# Patient Record
Sex: MALE | Race: WHITE | NOT HISPANIC OR LATINO | URBAN - METROPOLITAN AREA
[De-identification: names, ages, dates, MRNs, and addresses within clinical notes are randomized per-mention and may not be internally consistent; named-entity substitution may affect disease eponyms.]

---

## 2022-08-01 ENCOUNTER — INPATIENT (INPATIENT)
Facility: HOSPITAL | Age: 62
LOS: 2 days | Discharge: HOME | End: 2022-08-04
Attending: STUDENT IN AN ORGANIZED HEALTH CARE EDUCATION/TRAINING PROGRAM | Admitting: STUDENT IN AN ORGANIZED HEALTH CARE EDUCATION/TRAINING PROGRAM

## 2022-08-01 VITALS
SYSTOLIC BLOOD PRESSURE: 137 MMHG | DIASTOLIC BLOOD PRESSURE: 820 MMHG | OXYGEN SATURATION: 95 % | HEART RATE: 70 BPM | RESPIRATION RATE: 16 BRPM | TEMPERATURE: 96 F

## 2022-08-01 LAB
ALBUMIN SERPL ELPH-MCNC: 3.7 G/DL — SIGNIFICANT CHANGE UP (ref 3.5–5.2)
ALP SERPL-CCNC: 67 U/L — SIGNIFICANT CHANGE UP (ref 30–115)
ALT FLD-CCNC: 16 U/L — SIGNIFICANT CHANGE UP (ref 0–41)
ANION GAP SERPL CALC-SCNC: 10 MMOL/L — SIGNIFICANT CHANGE UP (ref 7–14)
ANION GAP SERPL CALC-SCNC: 13 MMOL/L — SIGNIFICANT CHANGE UP (ref 7–14)
APAP SERPL-MCNC: <5 UG/ML — LOW (ref 10–30)
APPEARANCE UR: CLEAR — SIGNIFICANT CHANGE UP
AST SERPL-CCNC: 27 U/L — SIGNIFICANT CHANGE UP (ref 0–41)
BACTERIA # UR AUTO: ABNORMAL
BASE EXCESS BLDV CALC-SCNC: -2 MMOL/L — SIGNIFICANT CHANGE UP (ref -2–3)
BASOPHILS # BLD AUTO: 0.01 K/UL — SIGNIFICANT CHANGE UP (ref 0–0.2)
BASOPHILS NFR BLD AUTO: 0.1 % — SIGNIFICANT CHANGE UP (ref 0–1)
BILIRUB SERPL-MCNC: 1.1 MG/DL — SIGNIFICANT CHANGE UP (ref 0.2–1.2)
BILIRUB UR-MCNC: NEGATIVE — SIGNIFICANT CHANGE UP
BUN SERPL-MCNC: 11 MG/DL — SIGNIFICANT CHANGE UP (ref 10–20)
BUN SERPL-MCNC: 9 MG/DL — LOW (ref 10–20)
CA-I SERPL-SCNC: 1 MMOL/L — LOW (ref 1.15–1.33)
CALCIUM SERPL-MCNC: 7.3 MG/DL — LOW (ref 8.5–10.1)
CALCIUM SERPL-MCNC: 7.3 MG/DL — LOW (ref 8.5–10.1)
CHLORIDE SERPL-SCNC: 84 MMOL/L — LOW (ref 98–110)
CHLORIDE SERPL-SCNC: 87 MMOL/L — LOW (ref 98–110)
CHLORIDE UR-SCNC: 23 — SIGNIFICANT CHANGE UP
CO2 SERPL-SCNC: 18 MMOL/L — SIGNIFICANT CHANGE UP (ref 17–32)
CO2 SERPL-SCNC: 20 MMOL/L — SIGNIFICANT CHANGE UP (ref 17–32)
COLOR SPEC: SIGNIFICANT CHANGE UP
CREAT ?TM UR-MCNC: 18 MG/DL — SIGNIFICANT CHANGE UP
CREAT SERPL-MCNC: 0.6 MG/DL — LOW (ref 0.7–1.5)
CREAT SERPL-MCNC: 0.7 MG/DL — SIGNIFICANT CHANGE UP (ref 0.7–1.5)
DIFF PNL FLD: ABNORMAL
EGFR: 103 ML/MIN/1.73M2 — SIGNIFICANT CHANGE UP
EGFR: 108 ML/MIN/1.73M2 — SIGNIFICANT CHANGE UP
EOSINOPHIL # BLD AUTO: 0.01 K/UL — SIGNIFICANT CHANGE UP (ref 0–0.7)
EOSINOPHIL NFR BLD AUTO: 0.1 % — SIGNIFICANT CHANGE UP (ref 0–8)
EPI CELLS # UR: 22 /HPF — HIGH (ref 0–5)
ETHANOL SERPL-MCNC: <10 MG/DL — SIGNIFICANT CHANGE UP
GAS PNL BLDV: 119 MMOL/L — CRITICAL LOW (ref 136–145)
GAS PNL BLDV: SIGNIFICANT CHANGE UP
GLUCOSE SERPL-MCNC: 103 MG/DL — HIGH (ref 70–99)
GLUCOSE SERPL-MCNC: 93 MG/DL — SIGNIFICANT CHANGE UP (ref 70–99)
GLUCOSE UR QL: NEGATIVE — SIGNIFICANT CHANGE UP
HCO3 BLDV-SCNC: 22 MMOL/L — SIGNIFICANT CHANGE UP (ref 22–29)
HCT VFR BLD CALC: 34 % — LOW (ref 42–52)
HCT VFR BLDA CALC: 40 % — SIGNIFICANT CHANGE UP (ref 39–51)
HGB BLD CALC-MCNC: 13.4 G/DL — SIGNIFICANT CHANGE UP (ref 12.6–17.4)
HGB BLD-MCNC: 12.9 G/DL — LOW (ref 14–18)
HYALINE CASTS # UR AUTO: 4 /LPF — SIGNIFICANT CHANGE UP (ref 0–7)
IMM GRANULOCYTES NFR BLD AUTO: 0.7 % — HIGH (ref 0.1–0.3)
KETONES UR-MCNC: ABNORMAL
LACTATE BLDV-MCNC: 0.8 MMOL/L — SIGNIFICANT CHANGE UP (ref 0.5–2)
LEUKOCYTE ESTERASE UR-ACNC: ABNORMAL
LYMPHOCYTES # BLD AUTO: 0.88 K/UL — LOW (ref 1.2–3.4)
LYMPHOCYTES # BLD AUTO: 8.6 % — LOW (ref 20.5–51.1)
MCHC RBC-ENTMCNC: 32 PG — HIGH (ref 27–31)
MCHC RBC-ENTMCNC: 37.9 G/DL — HIGH (ref 32–37)
MCV RBC AUTO: 84.4 FL — SIGNIFICANT CHANGE UP (ref 80–94)
MONOCYTES # BLD AUTO: 0.6 K/UL — SIGNIFICANT CHANGE UP (ref 0.1–0.6)
MONOCYTES NFR BLD AUTO: 5.9 % — SIGNIFICANT CHANGE UP (ref 1.7–9.3)
NEUTROPHILS # BLD AUTO: 8.62 K/UL — HIGH (ref 1.4–6.5)
NEUTROPHILS NFR BLD AUTO: 84.6 % — HIGH (ref 42.2–75.2)
NITRITE UR-MCNC: POSITIVE
NRBC # BLD: 0 /100 WBCS — SIGNIFICANT CHANGE UP (ref 0–0)
OSMOLALITY SERPL: 257 MOS/KG — LOW (ref 280–301)
OSMOLALITY UR: 177 MOS/KG — SIGNIFICANT CHANGE UP (ref 50–1200)
PCO2 BLDV: 36 MMHG — LOW (ref 42–55)
PH BLDV: 7.4 — SIGNIFICANT CHANGE UP (ref 7.32–7.43)
PH UR: 6.5 — SIGNIFICANT CHANGE UP (ref 5–8)
PLATELET # BLD AUTO: 107 K/UL — LOW (ref 130–400)
PO2 BLDV: 56 MMHG — SIGNIFICANT CHANGE UP
POTASSIUM BLDV-SCNC: 3.9 MMOL/L — SIGNIFICANT CHANGE UP (ref 3.5–5.1)
POTASSIUM SERPL-MCNC: 3.8 MMOL/L — SIGNIFICANT CHANGE UP (ref 3.5–5)
POTASSIUM SERPL-MCNC: 4.4 MMOL/L — SIGNIFICANT CHANGE UP (ref 3.5–5)
POTASSIUM SERPL-SCNC: 3.8 MMOL/L — SIGNIFICANT CHANGE UP (ref 3.5–5)
POTASSIUM SERPL-SCNC: 4.4 MMOL/L — SIGNIFICANT CHANGE UP (ref 3.5–5)
POTASSIUM UR-SCNC: 17 MMOL/L — SIGNIFICANT CHANGE UP
PROT SERPL-MCNC: 5.8 G/DL — LOW (ref 6–8)
PROT UR-MCNC: SIGNIFICANT CHANGE UP
RBC # BLD: 4.03 M/UL — LOW (ref 4.7–6.1)
RBC # FLD: 11.8 % — SIGNIFICANT CHANGE UP (ref 11.5–14.5)
RBC CASTS # UR COMP ASSIST: 8 /HPF — HIGH (ref 0–4)
SALICYLATES SERPL-MCNC: <0.3 MG/DL — LOW (ref 4–30)
SAO2 % BLDV: 84.5 % — SIGNIFICANT CHANGE UP
SARS-COV-2 RNA SPEC QL NAA+PROBE: DETECTED
SODIUM SERPL-SCNC: 115 MMOL/L — CRITICAL LOW (ref 135–146)
SODIUM SERPL-SCNC: 117 MMOL/L — CRITICAL LOW (ref 135–146)
SODIUM UR-SCNC: <20 MMOL/L — SIGNIFICANT CHANGE UP
SP GR SPEC: 1.02 — SIGNIFICANT CHANGE UP (ref 1.01–1.03)
TROPONIN T SERPL-MCNC: <0.01 NG/ML — SIGNIFICANT CHANGE UP
UROBILINOGEN FLD QL: SIGNIFICANT CHANGE UP
WBC # BLD: 10.19 K/UL — SIGNIFICANT CHANGE UP (ref 4.8–10.8)
WBC # FLD AUTO: 10.19 K/UL — SIGNIFICANT CHANGE UP (ref 4.8–10.8)
WBC UR QL: 7 /HPF — HIGH (ref 0–5)

## 2022-08-01 PROCEDURE — 93010 ELECTROCARDIOGRAM REPORT: CPT

## 2022-08-01 PROCEDURE — 99291 CRITICAL CARE FIRST HOUR: CPT

## 2022-08-01 PROCEDURE — 70498 CT ANGIOGRAPHY NECK: CPT | Mod: 26,MA

## 2022-08-01 PROCEDURE — 70496 CT ANGIOGRAPHY HEAD: CPT | Mod: 26,MA

## 2022-08-01 PROCEDURE — 0042T: CPT | Mod: MA

## 2022-08-01 PROCEDURE — 71045 X-RAY EXAM CHEST 1 VIEW: CPT | Mod: 26

## 2022-08-01 RX ORDER — ONDANSETRON 8 MG/1
4 TABLET, FILM COATED ORAL ONCE
Refills: 0 | Status: COMPLETED | OUTPATIENT
Start: 2022-08-01 | End: 2022-08-01

## 2022-08-01 RX ORDER — SODIUM CHLORIDE 9 MG/ML
1000 INJECTION INTRAMUSCULAR; INTRAVENOUS; SUBCUTANEOUS ONCE
Refills: 0 | Status: COMPLETED | OUTPATIENT
Start: 2022-08-01 | End: 2022-08-01

## 2022-08-01 RX ORDER — CEFTRIAXONE 500 MG/1
1000 INJECTION, POWDER, FOR SOLUTION INTRAMUSCULAR; INTRAVENOUS ONCE
Refills: 0 | Status: COMPLETED | OUTPATIENT
Start: 2022-08-01 | End: 2022-08-01

## 2022-08-01 RX ADMIN — SODIUM CHLORIDE 1000 MILLILITER(S): 9 INJECTION INTRAMUSCULAR; INTRAVENOUS; SUBCUTANEOUS at 21:06

## 2022-08-01 RX ADMIN — ONDANSETRON 4 MILLIGRAM(S): 8 TABLET, FILM COATED ORAL at 16:37

## 2022-08-01 RX ADMIN — CEFTRIAXONE 100 MILLIGRAM(S): 500 INJECTION, POWDER, FOR SOLUTION INTRAMUSCULAR; INTRAVENOUS at 22:00

## 2022-08-01 RX ADMIN — SODIUM CHLORIDE 1000 MILLILITER(S): 9 INJECTION INTRAMUSCULAR; INTRAVENOUS; SUBCUTANEOUS at 17:17

## 2022-08-01 NOTE — CONSULT NOTE ADULT - ASSESSMENT
Impression:  62 y.o. male, PMH of psychosis, GERD, HLD, BIBA for evaluation. Patient was last seen normal yesterday. Today, at 3pm staff of [facility] went to check on a patient and found him vomiting, minimally responsive. On arrival, he was awake, does not respond to questions, right pupil 4mm and left 2,mmpupils > right. Code stroke called on arrival. Not a candidate for IV thrombolytics due to being out of the window, not a candidate for IA intervention due to no LVO on CTA.     Suggestion:  Routine EEG  Seizure precauitons  Keep magnesium >2  Medical workup for encephalopathy.  CK, liver enzymes   If no cause found for AMS and rigidity, suggest toxicology consult.   MRI brain without tavon

## 2022-08-01 NOTE — STROKE CODE NOTE - IV ALTEPLASE EXCLUSION ABS OTHER
last known the previous day, not a candidate for IV thrombolytics. Patient without LVO on CTA not a candidate for IA intervention.

## 2022-08-01 NOTE — ED PROVIDER NOTE - CLINICAL SUMMARY MEDICAL DECISION MAKING FREE TEXT BOX
Pt with AMS and hyponatremia. Mental status improving while in the ED. Pt hydrated. Will admit to ICU for further care.

## 2022-08-01 NOTE — ED ADULT NURSE NOTE - OBJECTIVE STATEMENT
brought in from [facility] for AMS . Around 3pm ,patient was found minimally responsive with nausea and vomiting .Patient is alert not able to answer anything

## 2022-08-01 NOTE — CONSULT NOTE ADULT - SUBJECTIVE AND OBJECTIVE BOX
Neurology Consult    Patient is a 62y old  Male who presents with a chief complaint of altered mental status    HPI:  62 y.o. male, PMH of psychosis, GERD, HLD, BIBA for evaluation. Patient was last seen normal yesterday. Today, at 3pm staff of [facility] went to check on a patient and found him vomiting, minimally responsive. On arrival, he was awake, does not respond to questions, right pupil 4mm and left 2,mmpupils > right. Code stroke called on arrival.    PAST MEDICAL & SURGICAL HISTORY:      FAMILY HISTORY:      Social History: (-) x 3    Allergies    No Known Allergies    Intolerances        MEDICATIONS  (STANDING):  ondansetron Injectable 4 milliGRAM(s) IV Push once    MEDICATIONS  (PRN):    Vital Signs Last 24 Hrs  T(C): 35.6 (01 Aug 2022 16:28), Max: 35.6 (01 Aug 2022 16:28)  T(F): 96 (01 Aug 2022 16:28), Max: 96 (01 Aug 2022 16:28)  HR: 70 (01 Aug 2022 16:28) (70 - 70)  BP: 137/820 (01 Aug 2022 16:28) (137/820 - 137/820)  BP(mean): --  RR: 16 (01 Aug 2022 16:28) (16 - 16)  SpO2: 95% (01 Aug 2022 16:28) (95% - 95%)    Parameters below as of 01 Aug 2022 16:28  Patient On (Oxygen Delivery Method): room air        Examination:  Cognitive/Language: awake, not following commands    Eyes: reacts in both eyes to visual threat  right pupil is 4mm and non reactive, left pupil is 2mm and sluggish .  Face:no facial asymmetry  Motor examination: Bilateral UE flexed with increased tone. Bilateral LE can be flexed passively but tone is mildly increased. Mild tremor to bilateral UE LE  Formal Muscle Strength Testing: Moves all extremities symmetrically and spontaneously   Sensory examination:   Withdraws to peripheral noxious stimuli in all extremities.  Cerebellum:   Limited by mental status Gait deferred  Labs:                     Neuroimaging:  NCHCT:   < from: CT Angio Neck Stroke Protocol w/ IV Cont (08.01.22 @ 17:01) >  IMPRESSION:    1.  Motion limited study. No evidence of major vascular stenosis or   occlusion. Normal perfusion images.    2.  Retained fluid within a distended lower esophagus.    --- End of Report ---    < end of copied text >  < from: CT Brain Stroke Protocol (08.01.22 @ 16:44) >  IMPRESSION:    No evidence of acute intracranial hemorrhage or large territory infarct.      < end of copied text >    08-01-22 @ 17:56

## 2022-08-01 NOTE — CONSULT NOTE ADULT - NS ATTEND AMEND GEN_ALL_CORE FT
Patient seen and examined and agree with above except as noted.  Patients history, notes, labs, imaging, vitals and meds reviewed personally.  Patient presented with asymmetric pupils and poor responsiveness found to be severely hyponatremic     Plan as above (REEG; correct metabolic derangements)

## 2022-08-01 NOTE — ED ADULT TRIAGE NOTE - CHIEF COMPLAINT QUOTE
Pt BIBA from assisted living with altered mental status, right pupil dilation, vomiting and left arm contracted. Pt last seen well yesterday at unknown time. Code stroke activated. Pt + covid 7/26

## 2022-08-01 NOTE — ED PROVIDER NOTE - OBJECTIVE STATEMENT
63 yo male w/ PMH of psychosis, GERD, HLD presents from [facility] for AMS. Last time seen normal was yesterday, unknown exact time.  Today at 3pm, staff went to check up on him, found him unresponsive.  Unable to provide further HPI due to AMS.

## 2022-08-01 NOTE — ED PROVIDER NOTE - PHYSICAL EXAMINATION
GENERAL: Vomitus noted on pt, minimally responsive to painful stimuli only.   SKIN: warm, dry  HEAD: Normocephalic; atraumatic.  EYES: R pupil 4mm and nonreactive, L pupil 2mm  CARD: S1, S2 normal; no murmurs, gallops, or rubs. Regular rate and rhythm.   RESP: LCTAB; No wheezes, rales, rhonchi, or stridor.  ABD: soft, nontender, and nondistended  NEURO: Withdrawing to painful stimuli only, left arm contracted

## 2022-08-01 NOTE — ED PROVIDER NOTE - ATTENDING CONTRIBUTION TO CARE
62 y.o. male, PMH of psychosis, GERD, HLD, BIBA for evaluation. Pt was last seen normal yesterday. Today, at 3pm staff of [facility] went to check on a pt and found him vomiting, minimally responsive. On arrival, pt is awake, does not respond to questions, head NC/AT, no droop noted, left pupils > right, lungs CTA B/L, CV S1S2 regular, abdomen soft/NT/ND/(+)BS, ext (-) edema, moving all extremities in response to pain, does not follow command. Code stroke called on arrival. Will do labs, CT, XR, UA and reevaluate. Pt was COVID (+) on 7/26/22.

## 2022-08-02 LAB
ALBUMIN SERPL ELPH-MCNC: 3.6 G/DL — SIGNIFICANT CHANGE UP (ref 3.5–5.2)
ALBUMIN SERPL ELPH-MCNC: 3.7 G/DL — SIGNIFICANT CHANGE UP (ref 3.5–5.2)
ALBUMIN SERPL ELPH-MCNC: 4.2 G/DL — SIGNIFICANT CHANGE UP (ref 3.5–5.2)
ALP SERPL-CCNC: 68 U/L — SIGNIFICANT CHANGE UP (ref 30–115)
ALP SERPL-CCNC: 70 U/L — SIGNIFICANT CHANGE UP (ref 30–115)
ALP SERPL-CCNC: 80 U/L — SIGNIFICANT CHANGE UP (ref 30–115)
ALT FLD-CCNC: 18 U/L — SIGNIFICANT CHANGE UP (ref 0–41)
ALT FLD-CCNC: 20 U/L — SIGNIFICANT CHANGE UP (ref 0–41)
ALT FLD-CCNC: 20 U/L — SIGNIFICANT CHANGE UP (ref 0–41)
ANION GAP SERPL CALC-SCNC: 10 MMOL/L — SIGNIFICANT CHANGE UP (ref 7–14)
ANION GAP SERPL CALC-SCNC: 13 MMOL/L — SIGNIFICANT CHANGE UP (ref 7–14)
ANION GAP SERPL CALC-SCNC: 9 MMOL/L — SIGNIFICANT CHANGE UP (ref 7–14)
AST SERPL-CCNC: 26 U/L — SIGNIFICANT CHANGE UP (ref 0–41)
AST SERPL-CCNC: 27 U/L — SIGNIFICANT CHANGE UP (ref 0–41)
AST SERPL-CCNC: 32 U/L — SIGNIFICANT CHANGE UP (ref 0–41)
BASOPHILS # BLD AUTO: 0.01 K/UL — SIGNIFICANT CHANGE UP (ref 0–0.2)
BASOPHILS NFR BLD AUTO: 0.2 % — SIGNIFICANT CHANGE UP (ref 0–1)
BILIRUB SERPL-MCNC: 0.3 MG/DL — SIGNIFICANT CHANGE UP (ref 0.2–1.2)
BILIRUB SERPL-MCNC: 0.4 MG/DL — SIGNIFICANT CHANGE UP (ref 0.2–1.2)
BILIRUB SERPL-MCNC: 0.5 MG/DL — SIGNIFICANT CHANGE UP (ref 0.2–1.2)
BUN SERPL-MCNC: 8 MG/DL — LOW (ref 10–20)
BUN SERPL-MCNC: 9 MG/DL — LOW (ref 10–20)
BUN SERPL-MCNC: 9 MG/DL — LOW (ref 10–20)
CALCIUM SERPL-MCNC: 7.9 MG/DL — LOW (ref 8.5–10.1)
CALCIUM SERPL-MCNC: 8.3 MG/DL — LOW (ref 8.5–10.1)
CALCIUM SERPL-MCNC: 9 MG/DL — SIGNIFICANT CHANGE UP (ref 8.5–10.1)
CHLORIDE SERPL-SCNC: 100 MMOL/L — SIGNIFICANT CHANGE UP (ref 98–110)
CHLORIDE SERPL-SCNC: 100 MMOL/L — SIGNIFICANT CHANGE UP (ref 98–110)
CHLORIDE SERPL-SCNC: 99 MMOL/L — SIGNIFICANT CHANGE UP (ref 98–110)
CK SERPL-CCNC: 578 U/L — HIGH (ref 0–225)
CO2 SERPL-SCNC: 20 MMOL/L — SIGNIFICANT CHANGE UP (ref 17–32)
CO2 SERPL-SCNC: 23 MMOL/L — SIGNIFICANT CHANGE UP (ref 17–32)
CO2 SERPL-SCNC: 27 MMOL/L — SIGNIFICANT CHANGE UP (ref 17–32)
CREAT SERPL-MCNC: 0.7 MG/DL — SIGNIFICANT CHANGE UP (ref 0.7–1.5)
CREAT SERPL-MCNC: 0.7 MG/DL — SIGNIFICANT CHANGE UP (ref 0.7–1.5)
CREAT SERPL-MCNC: 0.9 MG/DL — SIGNIFICANT CHANGE UP (ref 0.7–1.5)
CRP SERPL-MCNC: 3 MG/L — SIGNIFICANT CHANGE UP
D DIMER BLD IA.RAPID-MCNC: <150 NG/ML DDU — SIGNIFICANT CHANGE UP (ref 0–230)
D DIMER BLD IA.RAPID-MCNC: <150 NG/ML DDU — SIGNIFICANT CHANGE UP (ref 0–230)
EGFR: 103 ML/MIN/1.73M2 — SIGNIFICANT CHANGE UP
EGFR: 103 ML/MIN/1.73M2 — SIGNIFICANT CHANGE UP
EGFR: 95 ML/MIN/1.73M2 — SIGNIFICANT CHANGE UP
EOSINOPHIL # BLD AUTO: 0 K/UL — SIGNIFICANT CHANGE UP (ref 0–0.7)
EOSINOPHIL NFR BLD AUTO: 0 % — SIGNIFICANT CHANGE UP (ref 0–8)
GLUCOSE BLDC GLUCOMTR-MCNC: 102 MG/DL — HIGH (ref 70–99)
GLUCOSE BLDC GLUCOMTR-MCNC: 374 MG/DL — HIGH (ref 70–99)
GLUCOSE BLDC GLUCOMTR-MCNC: 79 MG/DL — SIGNIFICANT CHANGE UP (ref 70–99)
GLUCOSE SERPL-MCNC: 106 MG/DL — HIGH (ref 70–99)
GLUCOSE SERPL-MCNC: 128 MG/DL — HIGH (ref 70–99)
GLUCOSE SERPL-MCNC: 77 MG/DL — SIGNIFICANT CHANGE UP (ref 70–99)
HCT VFR BLD CALC: 41.9 % — LOW (ref 42–52)
HCV AB S/CO SERPL IA: 0.03 COI — SIGNIFICANT CHANGE UP
HCV AB SERPL-IMP: SIGNIFICANT CHANGE UP
HGB BLD-MCNC: 14.9 G/DL — SIGNIFICANT CHANGE UP (ref 14–18)
IMM GRANULOCYTES NFR BLD AUTO: 0.5 % — HIGH (ref 0.1–0.3)
LDH SERPL L TO P-CCNC: 216 U/L — SIGNIFICANT CHANGE UP (ref 50–242)
LYMPHOCYTES # BLD AUTO: 1.15 K/UL — LOW (ref 1.2–3.4)
LYMPHOCYTES # BLD AUTO: 19.2 % — LOW (ref 20.5–51.1)
MAGNESIUM SERPL-MCNC: 2 MG/DL — SIGNIFICANT CHANGE UP (ref 1.8–2.4)
MCHC RBC-ENTMCNC: 30.7 PG — SIGNIFICANT CHANGE UP (ref 27–31)
MCHC RBC-ENTMCNC: 35.6 G/DL — SIGNIFICANT CHANGE UP (ref 32–37)
MCV RBC AUTO: 86.4 FL — SIGNIFICANT CHANGE UP (ref 80–94)
MONOCYTES # BLD AUTO: 0.8 K/UL — HIGH (ref 0.1–0.6)
MONOCYTES NFR BLD AUTO: 13.4 % — HIGH (ref 1.7–9.3)
NEUTROPHILS # BLD AUTO: 3.99 K/UL — SIGNIFICANT CHANGE UP (ref 1.4–6.5)
NEUTROPHILS NFR BLD AUTO: 66.7 % — SIGNIFICANT CHANGE UP (ref 42.2–75.2)
NRBC # BLD: 0 /100 WBCS — SIGNIFICANT CHANGE UP (ref 0–0)
PLATELET # BLD AUTO: 172 K/UL — SIGNIFICANT CHANGE UP (ref 130–400)
POTASSIUM SERPL-MCNC: 4.4 MMOL/L — SIGNIFICANT CHANGE UP (ref 3.5–5)
POTASSIUM SERPL-MCNC: 4.9 MMOL/L — SIGNIFICANT CHANGE UP (ref 3.5–5)
POTASSIUM SERPL-MCNC: 5.1 MMOL/L — HIGH (ref 3.5–5)
POTASSIUM SERPL-SCNC: 4.4 MMOL/L — SIGNIFICANT CHANGE UP (ref 3.5–5)
POTASSIUM SERPL-SCNC: 4.9 MMOL/L — SIGNIFICANT CHANGE UP (ref 3.5–5)
POTASSIUM SERPL-SCNC: 5.1 MMOL/L — HIGH (ref 3.5–5)
PROT SERPL-MCNC: 5.7 G/DL — LOW (ref 6–8)
PROT SERPL-MCNC: 5.9 G/DL — LOW (ref 6–8)
PROT SERPL-MCNC: 6.4 G/DL — SIGNIFICANT CHANGE UP (ref 6–8)
RBC # BLD: 4.85 M/UL — SIGNIFICANT CHANGE UP (ref 4.7–6.1)
RBC # FLD: 12.1 % — SIGNIFICANT CHANGE UP (ref 11.5–14.5)
SODIUM SERPL-SCNC: 131 MMOL/L — LOW (ref 135–146)
SODIUM SERPL-SCNC: 133 MMOL/L — LOW (ref 135–146)
SODIUM SERPL-SCNC: 137 MMOL/L — SIGNIFICANT CHANGE UP (ref 135–146)
WBC # BLD: 5.98 K/UL — SIGNIFICANT CHANGE UP (ref 4.8–10.8)
WBC # FLD AUTO: 5.98 K/UL — SIGNIFICANT CHANGE UP (ref 4.8–10.8)

## 2022-08-02 PROCEDURE — 99222 1ST HOSP IP/OBS MODERATE 55: CPT

## 2022-08-02 PROCEDURE — 95816 EEG AWAKE AND DROWSY: CPT | Mod: 26

## 2022-08-02 RX ORDER — NICOTINE POLACRILEX 2 MG
1 GUM BUCCAL DAILY
Refills: 0 | Status: DISCONTINUED | OUTPATIENT
Start: 2022-08-02 | End: 2022-08-04

## 2022-08-02 RX ORDER — CLOZAPINE 150 MG/1
400 TABLET, ORALLY DISINTEGRATING ORAL AT BEDTIME
Refills: 0 | Status: DISCONTINUED | OUTPATIENT
Start: 2022-08-02 | End: 2022-08-03

## 2022-08-02 RX ORDER — PANTOPRAZOLE SODIUM 20 MG/1
40 TABLET, DELAYED RELEASE ORAL
Refills: 0 | Status: DISCONTINUED | OUTPATIENT
Start: 2022-08-02 | End: 2022-08-04

## 2022-08-02 RX ORDER — SENNA PLUS 8.6 MG/1
2 TABLET ORAL AT BEDTIME
Refills: 0 | Status: DISCONTINUED | OUTPATIENT
Start: 2022-08-02 | End: 2022-08-04

## 2022-08-02 RX ORDER — ENOXAPARIN SODIUM 100 MG/ML
40 INJECTION SUBCUTANEOUS EVERY 24 HOURS
Refills: 0 | Status: DISCONTINUED | OUTPATIENT
Start: 2022-08-02 | End: 2022-08-04

## 2022-08-02 RX ORDER — SODIUM CHLORIDE 9 MG/ML
500 INJECTION INTRAMUSCULAR; INTRAVENOUS; SUBCUTANEOUS ONCE
Refills: 0 | Status: COMPLETED | OUTPATIENT
Start: 2022-08-02 | End: 2022-08-02

## 2022-08-02 RX ORDER — DESMOPRESSIN ACETATE 0.1 MG/1
0.1 TABLET ORAL ONCE
Refills: 0 | Status: DISCONTINUED | OUTPATIENT
Start: 2022-08-02 | End: 2022-08-02

## 2022-08-02 RX ORDER — POLYETHYLENE GLYCOL 3350 17 G/17G
17 POWDER, FOR SOLUTION ORAL
Refills: 0 | Status: DISCONTINUED | OUTPATIENT
Start: 2022-08-02 | End: 2022-08-04

## 2022-08-02 RX ORDER — CEFTRIAXONE 500 MG/1
1000 INJECTION, POWDER, FOR SOLUTION INTRAMUSCULAR; INTRAVENOUS EVERY 24 HOURS
Refills: 0 | Status: COMPLETED | OUTPATIENT
Start: 2022-08-02 | End: 2022-08-04

## 2022-08-02 RX ORDER — SODIUM CHLORIDE 9 MG/ML
1000 INJECTION, SOLUTION INTRAVENOUS
Refills: 0 | Status: DISCONTINUED | OUTPATIENT
Start: 2022-08-02 | End: 2022-08-02

## 2022-08-02 RX ORDER — SODIUM CHLORIDE 9 MG/ML
1000 INJECTION, SOLUTION INTRAVENOUS
Refills: 0 | Status: DISCONTINUED | OUTPATIENT
Start: 2022-08-02 | End: 2022-08-03

## 2022-08-02 RX ADMIN — ENOXAPARIN SODIUM 40 MILLIGRAM(S): 100 INJECTION SUBCUTANEOUS at 08:12

## 2022-08-02 RX ADMIN — PANTOPRAZOLE SODIUM 40 MILLIGRAM(S): 20 TABLET, DELAYED RELEASE ORAL at 08:11

## 2022-08-02 RX ADMIN — CEFTRIAXONE 100 MILLIGRAM(S): 500 INJECTION, POWDER, FOR SOLUTION INTRAMUSCULAR; INTRAVENOUS at 06:01

## 2022-08-02 RX ADMIN — SODIUM CHLORIDE 1000 MILLILITER(S): 9 INJECTION INTRAMUSCULAR; INTRAVENOUS; SUBCUTANEOUS at 08:12

## 2022-08-02 RX ADMIN — SODIUM CHLORIDE 100 MILLILITER(S): 9 INJECTION, SOLUTION INTRAVENOUS at 11:36

## 2022-08-02 NOTE — H&P ADULT - NSHPLABSRESULTS_GEN_ALL_CORE
LABS:  cret                        12.9   10.19 )-----------( 107      ( 01 Aug 2022 18:07 )             34.0     08-01    117<LL>  |  87<L>  |  9<L>  ----------------------------<  93  3.8   |  20  |  0.6<L>    Ca    7.3<L>      01 Aug 2022 21:10    TPro  5.8<L>  /  Alb  3.7  /  TBili  1.1  /  DBili  x   /  AST  27  /  ALT  16  /  AlkPhos  67  08-01      troponin: <0.01    < from: CT Brain Stroke Protocol (08.01.22 @ 16:44) >    IMPRESSION:    No evidence of acute intracranial hemorrhage or large territory infarct.    < end of copied text >    < from: CT Brain Perfusion Maps Stroke (08.01.22 @ 16:57) >    IMPRESSION:    1.  Motion limited study. No evidence of major vascular stenosis or   occlusion. Normal perfusion images.    < end of copied text >    < from: CT Angio Neck Stroke Protocol w/ IV Cont (08.01.22 @ 17:01) >    IMPRESSION:    1.  Motion limited study. No evidence of major vascular stenosis or   occlusion. Normal perfusion images.    2.  Retained fluid within a distended lower esophagus.    < end of copied text >    < from: Xray Chest 1 View AP/PA (08.01.22 @ 18:51) >    Impression:    No radiographic evidence of acute cardiopulmonary disease.    < end of copied text >

## 2022-08-02 NOTE — H&P ADULT - NSICDXPASTMEDICALHX_GEN_ALL_CORE_FT
PAST MEDICAL HISTORY:  GERD (gastroesophageal reflux disease)     Hyperlipidemia     Schizophrenia

## 2022-08-02 NOTE — ED ADULT NURSE REASSESSMENT NOTE - NS ED NURSE REASSESS COMMENT FT1
Health care proxy called unit and wanted to confirm his medications, clozapine 100mg at night 4 tabs total of 400 mg was not ordered. MD Hdz [id number] contacted and made aware due to patient becoming uncooperative he states "were postioning him". Health care proxy called unit and wanted to confirm his medications, clozapine 100mg at night 4 tabs total of 400 mg was not ordered. MD Hdz [id number] contacted and made aware due to patient becoming uncooperative he states "were poisoning him".

## 2022-08-02 NOTE — SWALLOW BEDSIDE ASSESSMENT ADULT - SLP PERTINENT HISTORY OF CURRENT PROBLEM
62 y.o M adm from [facility] for AMS , dx'd w/ severe hyponatremia w/ encephalopathy . +COVID. + UA< CTH negative for acute changes. Pmhx: psychosis, GERD, HLD<

## 2022-08-02 NOTE — CONSULT NOTE ADULT - ASSESSMENT
61 yo male w/ PMH of psychosis, GERD, HLD presents from [facility] for AMS. Last time seen normal was yesterday. Stroke work up negative, patient found to be in hyponatremia with encephalopathy. he received 2.5 L of NS with overcorrection of his Na levels. Nephro consult    Assessment and plan  Hyponatremia overcorrected/ Schizophernia  Hyponatremia initially secondary to low solute intake, low urine osm with low urine Na  s/p 2.5 L of NS with overcorrection 115-> 137 in 12 hours  patient started on D5W infusion at 100 cc/hr  c/w d5w, target na today around 125  repeat lytes q 8 hours  will follow

## 2022-08-02 NOTE — CONSULT NOTE ADULT - ASSESSMENT
IMPRESSION:    Severe hyponatremia   COVID 19 infection   HO schizophrenia      PLAN:    CNS:  No depressants.  Continue home psych medications     HEENT: Oral care    PULMONARY:  HOB @ 45 degrees.  Aspiration precautions.  Monitor PUlse Ox.      CARDIOVASCULAR:  NS 50 cc per hour.  Avoid over correction.      GI: GI prophylaxis.  Feeding.  Bowel regimen.  Free H2O restriction     RENAL:  Follow up lytes in 4 hours.  Correct as needed.  Water restriction.  Monitor Lytes.  Avoid over correction     INFECTIOUS DISEASE: Follow up cultures.  Procal.  ID Eval.  Hold ABX for now     HEMATOLOGICAL:  DVT prophylaxis.  Dimer     ENDOCRINE:  Follow up FS.      MUSCULOSKELETAL:  OOB to chair with fall precautions.      SDU For now

## 2022-08-02 NOTE — CONSULT NOTE ADULT - SUBJECTIVE AND OBJECTIVE BOX
Patient is a 62y old  Male who presents with a chief complaint of hyponatremia (02 Aug 2022 01:48)      HPI:   63 yo male w/ PMH of psychosis, GERD, HLD presents from [facility] for AMS. Last time seen normal was yesterday. At the time of change in mental status, staff noticed that he was unresponsive. At the ED, patient VS were stable, slightly afebrile (35.6C), 95% on RA.  BG was 120.  Stroke code was called on arrival. He was found to be awake, not responding to questions, Right pupil 4mm, left pupil 2mm. CT head showed no acute intracranial pathology, CTA shows no LVO. Labs were remarkable for Hb (12.9), and Sodium (115) + chloride (88) + low osmolality (257). Urine osmolality normal (117) and Na Urine < 20. UA (+) for LE, nitrite, and bacteria. COVID-19 (+). Alcohol, salicylate, and acetaminophen were (-). Patient was given 1x 1G IV ceftriaxone, and NS 2L bolus. Sodium subsequently improved to 117 in 3 hours. On exam when talking with patient, he does not remember why he was brought to the hospital. He has no symptomatic complaints. Denies headaches, dizziness, confusion, chest pain, shortness of breath, abdominal pain, n/v, dysuria.     Patient is being admitted to ICU for severe hyponatremia with encephalopathy.     Update: patient takes clozapine and thiothiexene. Patient does not know dose, no pharmacy listed on record or physical chart. Please confirm dose tomorrow before restarting.  (02 Aug 2022 01:48)      PAST MEDICAL & SURGICAL HISTORY:  Schizophrenia      Hyperlipidemia      GERD (gastroesophageal reflux disease)          SOCIAL HX:   Smoking   Positive                       ETOH                            Other    FAMILY HISTORY:  :  No known cardiovacular family hisotry     Review Of Systems:     All ROS are negative except per HPI       Allergies    No Known Allergies    Intolerances          PHYSICAL EXAM    ICU Vital Signs Last 24 Hrs  T(C): 36.7 (02 Aug 2022 07:12), Max: 37.1 (01 Aug 2022 19:54)  T(F): 98.1 (02 Aug 2022 07:12), Max: 98.8 (01 Aug 2022 19:54)  HR: 75 (02 Aug 2022 07:12) (70 - 79)  BP: 100/68 (02 Aug 2022 07:12) (100/68 - 140/68)  BP(mean): --  ABP: --  ABP(mean): --  RR: 18 (02 Aug 2022 07:12) (16 - 18)  SpO2: 99% (02 Aug 2022 07:12) (95% - 99%)    O2 Parameters below as of 01 Aug 2022 21:29  Patient On (Oxygen Delivery Method): room air            CONSTITUTIONAL:  In NAD    ENT:   Airway patent,   Mouth with normal mucosa.       CARDIAC:   Normal rate,   Regular rhythm.        RESPIRATORY:   No wheezing  Bilateral BS   Not tachypneic,  No use of accessory muscles    GASTROINTESTINAL:  Abdomen soft,   Non-tender,   No guarding,   + BS      NEUROLOGICAL:   Alert   Follows simple commands   No motor deficits.    SKIN:   Skin normal color for race,   No evidence of rash.                22 @ 07:01  -  22 @ 07:00  --------------------------------------------------------  IN:    IV PiggyBack: 50 mL    Sodium Chloride 0.9% Bolus: 2000 mL  Total IN: 2050 mL    OUT:    Voided (mL): 4000 mL  Total OUT: 4000 mL    Total NET: -1950 mL          LABS:                          12.9   10.19 )-----------( 107      ( 01 Aug 2022 18:07 )             34.0                                               08    117<LL>  |  87<L>  |  9<L>  ----------------------------<  93  3.8   |  20  |  0.6<L>    Ca    7.3<L>      01 Aug 2022 21:10    TPro  5.8<L>  /  Alb  3.7  /  TBili  1.1  /  DBili  x   /  AST  27  /  ALT  16  /  AlkPhos  67  08                                             Urinalysis Basic - ( 01 Aug 2022 19:40 )    Color: Light Yellow / Appearance: Clear / S.017 / pH: x  Gluc: x / Ketone: Small  / Bili: Negative / Urobili: <2 mg/dL   Blood: x / Protein: Trace / Nitrite: Positive   Leuk Esterase: Moderate / RBC: 8 /HPF / WBC 7 /HPF   Sq Epi: x / Non Sq Epi: 22 /HPF / Bacteria: Many        CARDIAC MARKERS ( 01 Aug 2022 18:07 )  x     / <0.01 ng/mL / x     / x     / x                                                LIVER FUNCTIONS - ( 01 Aug 2022 18:07 )  Alb: 3.7 g/dL / Pro: 5.8 g/dL / ALK PHOS: 67 U/L / ALT: 16 U/L / AST: 27 U/L / GGT: x                                                                                                                                       X-Rays reviewed                                                                                     ECHO        MEDICATIONS  (STANDING):  cefTRIAXone   IVPB 1000 milliGRAM(s) IV Intermittent every 24 hours  enoxaparin Injectable 40 milliGRAM(s) SubCutaneous every 24 hours  nicotine -   7 mG/24Hr(s) Patch 1 Patch Transdermal daily  pantoprazole    Tablet 40 milliGRAM(s) Oral before breakfast    MEDICATIONS  (PRN):

## 2022-08-02 NOTE — H&P ADULT - NSHPPHYSICALEXAM_GEN_ALL_CORE
PHYSICAL EXAM:  GENERAL: NAD, lying in bed comfortably  HEAD:  Atraumatic, Normocephalic  EYES: EOMI, pupils Right 4mm > Left 2mm, equally reactive b/l, conjunctiva and sclera clear  ENT: Moist mucous membranes  NECK: Supple, No JVD  CHEST/LUNG: Clear to auscultation bilaterally; No rales, rhonchi, wheezing, or rubs. Unlabored respirations  HEART: Regular rate and rhythm; No murmurs, rubs, or gallops  ABDOMEN: Bowel sounds present; Soft, Nontender, Nondistended. No hepatomegaly  EXTREMITIES:  2+ Peripheral Pulses, brisk capillary refill. No clubbing, cyanosis, or edema  NERVOUS SYSTEM:  Alert & Oriented X3, speech clear. No deficits   MSK: FROM all 4 extremities, full and equal strength  SKIN: No rashes or lesions

## 2022-08-02 NOTE — H&P ADULT - ASSESSMENT
61 yo male w/ PMH of psychosis, GERD, HLD presents from [facility] for AMS, found to have hyponatremia with Na- 115.    IMPRESSION:    Severe Hyponatremia (Na 115) with Encephalopathy   Mild COVID - 19   UTI  Schizophrenia  Active tobacco smoker    PLAN    CNS: No sedation, EEG, MRI brain, urine and serum tox, nicotine patch  - confirm schizophrenic medications    HEENT: HOB 30-45 degrees, aspiration precautions. oral care    CARDIAC: GDFR, Keep MAP >65, avoid fluid overload    PULMONARY: Keep POx > 94%.     GASTROENTEROLOGY: PPI ppx. Diet. NPO.     RENAL: Monitor CMP & UO. Correct as needed. BMP q 4. Trend Na, correction should not exceed 8 in 24 hours, urine lytes, creatinine kinase    INFECTIOUS: UA +, c/w Rocephin, follow urine Cx    HEMATOLOGY: DVT ppx    ENDOCRINOLOGY: Check FS, insulin protocol as needed    MUSCULOSKELETAL: Bed rest    DISPOSITION: MICU.    Please confirm dose of his schizophrenic medications in AM with [facility] before restarting.

## 2022-08-02 NOTE — H&P ADULT - HISTORY OF PRESENT ILLNESS
61 yo male w/ PMH of psychosis, GERD, HLD presents from [facility] for AMS. Last time seen normal was yesterday. At the time of change in mental status, staff noticed that he was unresponsive. At the ED, patient VS were stable, slightly afebrile (35.6C), 95% on RA.  BG was 120.  Stroke code was called on arrival. He was found to be awake, not responding to questions, Right pupil 4mm, left pupil 2mm. CT head showed no acute intracranial pathology, CTA shows no LVO. Labs were remarkable for Hb (12.9), and Sodium (115) + chloride (88) + low osmolality (257). Urine osmolality normal (117) and Na Urine < 20. UA (+) for LE, nitrite, and bacteria. COVID-19 (+). Alcohol, salicylate, and acetaminophen were (-). Patient was given 1x 1G IV ceftriaxone, and NS 2L bolus. Sodium subsequently improved to 117 in 3 hours. On exam when talking with patient, he does not remember why he was brought to the hospital. He has no symptomatic complaints. Denies headaches, dizziness, confusion, chest pain, shortness of breath, abdominal pain, n/v, dysuria.     Patient is being admitted to ICU for severe hyponatremia with encephalopathy.     Update: patient takes clozapine and thiothiexene. Patient does not know dose, no pharmacy listed on record or physical chart. Please confirm dose tomorrow before restarting.

## 2022-08-02 NOTE — CONSULT NOTE ADULT - SUBJECTIVE AND OBJECTIVE BOX
NEPHROLOGY CONSULTATION NOTE    63 yo male w/ PMH of psychosis, GERD, HLD presents from [facility] for AMS. Last time seen normal was yesterday. At the time of change in mental status, staff noticed that he was unresponsive. At the ED, patient VS were stable, slightly afebrile (35.6C), 95% on RA.  BG was 120.  Stroke code was called on arrival. He was found to be awake, not responding to questions, Right pupil 4mm, left pupil 2mm. CT head showed no acute intracranial pathology, CTA shows no LVO. Labs were remarkable for Hb (12.9), and Sodium (115) + chloride (88) + low osmolality (257). Urine osmolality normal (117) and Na Urine < 20. UA (+) for LE, nitrite, and bacteria. COVID-19 (+). Alcohol, salicylate, and acetaminophen were (-). Patient was given 1x 1G IV ceftriaxone, and NS 2L bolus. Sodium subsequently improved to 117 in 3 hours. On exam when talking with patient, he does not remember why he was brought to the hospital. He has no symptomatic complaints. Denies headaches, dizziness, confusion, chest pain, shortness of breath, abdominal pain, n/v, dysuria.     Patient admitted to ICU for severe hyponatremia with encephalopathy.     Patient has received 2 L NS and Na went up from 115 to 117 in PM  s/p  cc today  AM Na 137; started on D5W  Neurologically stable , alert     PAST MEDICAL & SURGICAL HISTORY:  Schizophrenia      Hyperlipidemia      GERD (gastroesophageal reflux disease)        Allergies:  No Known Allergies    Home Medications Reviewed  Hospital Medications:   MEDICATIONS  (STANDING):  cefTRIAXone   IVPB 1000 milliGRAM(s) IV Intermittent every 24 hours  dextrose 5%. 1000 milliLiter(s) (100 mL/Hr) IV Continuous <Continuous>  enoxaparin Injectable 40 milliGRAM(s) SubCutaneous every 24 hours  nicotine -   7 mG/24Hr(s) Patch 1 Patch Transdermal daily  pantoprazole    Tablet 40 milliGRAM(s) Oral before breakfast  polyethylene glycol 3350 17 Gram(s) Oral two times a day  senna 2 Tablet(s) Oral at bedtime    SOCIAL HISTORY:  Denies ETOH,Smoking,   FAMILY HISTORY:      REVIEW OF SYSTEMS:  CONSTITUTIONAL: No weakness, fevers or chills  EYES/ENT: No visual changes;  No vertigo or throat pain   NECK: No pain or stiffness  RESPIRATORY: No cough, wheezing, hemoptysis; No shortness of breath  CARDIOVASCULAR: No chest pain or palpitations.  GASTROINTESTINAL: No abdominal or epigastric pain. No nausea, vomiting, or hematemesis; No diarrhea or constipation. No melena or hematochezia.  GENITOURINARY: No dysuria, frequency, foamy urine, urinary urgency, incontinence or hematuria  NEUROLOGICAL: No numbness or weakness  SKIN: No itching, burning, rashes, or lesions   VASCULAR: No bilateral lower extremity edema.   All other review of systems is negative unless indicated above.    VITALS:  Vital Signs Last 24 Hrs  T(C): 36.7 (02 Aug 2022 11:30), Max: 37.1 (01 Aug 2022 19:54)  T(F): 98.1 (02 Aug 2022 11:30), Max: 98.8 (01 Aug 2022 19:54)  HR: 66 (02 Aug 2022 14:30) (58 - 79)  BP: 109/74 (02 Aug 2022 14:30) (81/53 - 140/68)  BP(mean): 88 (02 Aug 2022 14:30) (63 - 88)  RR: 18 (02 Aug 2022 14:30) (16 - 20)  SpO2: 99% (02 Aug 2022 14:30) (95% - 99%)    Parameters below as of 02 Aug 2022 14:30  Patient On (Oxygen Delivery Method): room air         @ 07:01  -   @ 07:00  --------------------------------------------------------  IN: 2050 mL / OUT: 4000 mL / NET: -1950 mL        PHYSICAL EXAM:  Constitutional: NAD  HEENT: anicteric sclera, oropharynx clear, MMM  Neck: No JVD  Respiratory: CTAB, no wheezes, rales or rhonchi  Cardiovascular: S1, S2, RRR  Gastrointestinal: BS+, soft, NT/ND  Extremities: No cyanosis or clubbing. No peripheral edema  Neurological: A/O x 3, no focal deficits  Psychiatric: Normal mood, normal affect  : No CVA tenderness. No mujica.   Skin: No rashes    LABS:      137  |  100  |  9<L>  ----------------------------<  77  5.1<H>   |  27  |  0.9    Ca    9.0      02 Aug 2022 07:00  Mg     2.0         TPro  6.4  /  Alb  4.2  /  TBili  0.5  /  DBili      /  AST  27  /  ALT  20  /  AlkPhos  80      Creatinine Trend: 0.9 <--, 0.6 <--, 0.7 <--                        14.9   5.98  )-----------( 172      ( 02 Aug 2022 07:00 )             41.9     Urine Studies:  Urinalysis Basic - ( 01 Aug 2022 19:40 )    Color: Light Yellow / Appearance: Clear / S.017 / pH:   Gluc:  / Ketone: Small  / Bili: Negative / Urobili: <2 mg/dL   Blood:  / Protein: Trace / Nitrite: Positive   Leuk Esterase: Moderate / RBC: 8 /HPF / WBC 7 /HPF   Sq Epi:  / Non Sq Epi: 22 /HPF / Bacteria: Many      Osmolality, Random Urine: 177 mos/kg ( @ 21:11)  Sodium, Random Urine: <20.0 mmoL/L ( @ 21:11)  Potassium, Random Urine: 17 mmol/L ( @ 21:11)  Chloride, Random Urine: 23 ( @ 21:11)  Creatinine, Random Urine: 18 mg/dL ( @ 21:11)

## 2022-08-03 LAB
ANION GAP SERPL CALC-SCNC: 6 MMOL/L — LOW (ref 7–14)
BUN SERPL-MCNC: 7 MG/DL — LOW (ref 10–20)
CALCIUM SERPL-MCNC: 8.2 MG/DL — LOW (ref 8.5–10.1)
CHLORIDE SERPL-SCNC: 99 MMOL/L — SIGNIFICANT CHANGE UP (ref 98–110)
CO2 SERPL-SCNC: 27 MMOL/L — SIGNIFICANT CHANGE UP (ref 17–32)
CREAT SERPL-MCNC: 0.7 MG/DL — SIGNIFICANT CHANGE UP (ref 0.7–1.5)
EGFR: 103 ML/MIN/1.73M2 — SIGNIFICANT CHANGE UP
FERRITIN SERPL-MCNC: 398 NG/ML — SIGNIFICANT CHANGE UP (ref 30–400)
GLUCOSE SERPL-MCNC: 128 MG/DL — HIGH (ref 70–99)
POTASSIUM SERPL-MCNC: 4.5 MMOL/L — SIGNIFICANT CHANGE UP (ref 3.5–5)
POTASSIUM SERPL-SCNC: 4.5 MMOL/L — SIGNIFICANT CHANGE UP (ref 3.5–5)
PROCALCITONIN SERPL-MCNC: 0.05 NG/ML — SIGNIFICANT CHANGE UP (ref 0.02–0.1)
SODIUM SERPL-SCNC: 132 MMOL/L — LOW (ref 135–146)

## 2022-08-03 PROCEDURE — 99233 SBSQ HOSP IP/OBS HIGH 50: CPT

## 2022-08-03 PROCEDURE — 71045 X-RAY EXAM CHEST 1 VIEW: CPT | Mod: 26

## 2022-08-03 RX ORDER — THIOTHIXENE 5 MG
20 CAPSULE ORAL
Refills: 0 | Status: DISCONTINUED | OUTPATIENT
Start: 2022-08-03 | End: 2022-08-04

## 2022-08-03 RX ORDER — CLOZAPINE 150 MG/1
100 TABLET, ORALLY DISINTEGRATING ORAL
Refills: 0 | Status: DISCONTINUED | OUTPATIENT
Start: 2022-08-03 | End: 2022-08-04

## 2022-08-03 RX ADMIN — SENNA PLUS 2 TABLET(S): 8.6 TABLET ORAL at 22:01

## 2022-08-03 RX ADMIN — CEFTRIAXONE 100 MILLIGRAM(S): 500 INJECTION, POWDER, FOR SOLUTION INTRAMUSCULAR; INTRAVENOUS at 06:34

## 2022-08-03 RX ADMIN — PANTOPRAZOLE SODIUM 40 MILLIGRAM(S): 20 TABLET, DELAYED RELEASE ORAL at 06:44

## 2022-08-03 RX ADMIN — Medication 20 MILLIGRAM(S): at 22:00

## 2022-08-03 RX ADMIN — POLYETHYLENE GLYCOL 3350 17 GRAM(S): 17 POWDER, FOR SOLUTION ORAL at 06:44

## 2022-08-03 RX ADMIN — CLOZAPINE 100 MILLIGRAM(S): 150 TABLET, ORALLY DISINTEGRATING ORAL at 22:58

## 2022-08-03 NOTE — PROGRESS NOTE ADULT - ASSESSMENT
IMPRESSION:    Severe hyponatremia improved  UTI  COVID 19 infection   HO schizophrenia      PLAN:    CNS:  No depressants.  Continue home psych medications     HEENT: Oral care    PULMONARY:  HOB @ 45 degrees.  Aspiration precautions.  Monitor PUlse Ox.      CARDIOVASCULAR:  DC D 5 W    GI: GI prophylaxis.  Feeding.  Bowel regimen.  Free H2O restriction     RENAL:  repeat CMP    INFECTIOUS DISEASE: Follow up cultures.  Procal.  ID Eval.  Hold ABX for now     HEMATOLOGICAL:  DVT prophylaxis.     ENDOCRINE:  Follow up FS.      MUSCULOSKELETAL:  OOB to chair with fall precautions.      FLOOR

## 2022-08-03 NOTE — PROGRESS NOTE ADULT - SUBJECTIVE AND OBJECTIVE BOX
SUBJECTIVE:    Patient is a 62y old Male who presents with a chief complaint of hyponatremia (03 Aug 2022 13:33)    Overnight Events: patient is stable, no acute events overnight.  VS within normal, no major complaints.    PAST MEDICAL & SURGICAL HISTORY  Schizophrenia    Hyperlipidemia    GERD (gastroesophageal reflux disease)      SOCIAL HISTORY:  Negative for smoking/alcohol/drug use.     ALLERGIES:  No Known Allergies    MEDICATIONS:  STANDING MEDICATIONS  cefTRIAXone   IVPB 1000 milliGRAM(s) IV Intermittent every 24 hours  cloZAPine 400 milliGRAM(s) Oral at bedtime  enoxaparin Injectable 40 milliGRAM(s) SubCutaneous every 24 hours  nicotine -   7 mG/24Hr(s) Patch 1 Patch Transdermal daily  pantoprazole    Tablet 40 milliGRAM(s) Oral before breakfast  polyethylene glycol 3350 17 Gram(s) Oral two times a day  senna 2 Tablet(s) Oral at bedtime    PRN MEDICATIONS    VITALS:   T(F): 97.3, Max: 97.3 (22 @ 18:00)  HR: 48 (48 - 70)  BP: 119/58 (91/59 - 142/71)  RR: 18 (16 - 18)  SpO2: 94% (94% - 99%)    LABS:                        14.9   5.98  )-----------( 172      ( 02 Aug 2022 07:00 )             41.9     08-    131<L>  |  99  |  8<L>  ----------------------------<  128<H>  4.9   |  23  |  0.7    Ca    7.9<L>      02 Aug 2022 21:58  Mg     2.0     -    TPro  5.7<L>  /  Alb  3.6  /  TBili  0.4  /  DBili  x   /  AST  32  /  ALT  20  /  AlkPhos  68  08-      Urinalysis Basic - ( 01 Aug 2022 19:40 )    Color: Light Yellow / Appearance: Clear / S.017 / pH: x  Gluc: x / Ketone: Small  / Bili: Negative / Urobili: <2 mg/dL   Blood: x / Protein: Trace / Nitrite: Positive   Leuk Esterase: Moderate / RBC: 8 /HPF / WBC 7 /HPF   Sq Epi: x / Non Sq Epi: 22 /HPF / Bacteria: Many            Culture - Urine (collected 01 Aug 2022 19:40)  Source: Clean Catch Clean Catch (Midstream)  Preliminary Report (02 Aug 2022 22:39):    >100,000 CFU/ml Gram Negative Rods      CARDIAC MARKERS ( 02 Aug 2022 07:00 )  x     / x     / 578 U/L / x     / x      CARDIAC MARKERS ( 01 Aug 2022 18:07 )  x     / <0.01 ng/mL / x     / x     / x          PHYSICAL EXAM:  GEN: NAD, comfortable  LUNGS: CTAB, no w/r/r  HEART: RRR, s1 and s2 appreciated, no m/r/g  ABD: soft, NT/ND, +BS  EXT: no edema, PP b/l  NEURO: AAOX3

## 2022-08-03 NOTE — PROGRESS NOTE ADULT - SUBJECTIVE AND OBJECTIVE BOX
YOLANDAELIEL MI  62y, Male  Allergy: No Known Allergies    Hospital Day: 2d    Patient seen and examined. No acute events overnight    PMH/PSH:  PAST MEDICAL & SURGICAL HISTORY:  Schizophrenia    Hyperlipidemia      GERD (gastroesophageal reflux disease)      VITALS:  T(F): 97.3 (22 @ 18:00), Max: 97.3 (22 @ 18:00)  HR: 48 (22 @ 06:00)  BP: 119/58 (22 @ 06:00) (91/59 - 142/71)  RR: 18 (22 @ 06:00)  SpO2: 94% (22 @ 06:00)    TESTS & MEASUREMENTS:  Weight (Kg):     22 @ 07:01  -  22 @ 07:00  --------------------------------------------------------  IN: 2050 mL / OUT: 4000 mL / NET: -1950 mL                        14.9   5.98  )-----------( 172      ( 02 Aug 2022 07:00 )             41.9     08-02    131<L>  |  99  |  8<L>  ----------------------------<  128<H>  4.9   |  23  |  0.7    Ca    7.9<L>      02 Aug 2022 21:58  Mg     2.0     08-    TPro  5.7<L>  /  Alb  3.6  /  TBili  0.4  /  DBili  x   /  AST  32  /  ALT  20  /  AlkPhos  68  08-02    LIVER FUNCTIONS - ( 02 Aug 2022 21:58 )  Alb: 3.6 g/dL / Pro: 5.7 g/dL / ALK PHOS: 68 U/L / ALT: 20 U/L / AST: 32 U/L / GGT: x           CARDIAC MARKERS ( 02 Aug 2022 07:00 )  x     / x     / 578 U/L / x     / x      CARDIAC MARKERS ( 01 Aug 2022 18:07 )  x     / <0.01 ng/mL / x     / x     / x        Culture - Urine (collected 22 @ 19:40)  Source: Clean Catch Clean Catch (Midstream)  Preliminary Report (22 @ 22:39):    >100,000 CFU/ml Gram Negative Rods    Urinalysis Basic - ( 01 Aug 2022 19:40 )    Color: Light Yellow / Appearance: Clear / S.017 / pH: x  Gluc: x / Ketone: Small  / Bili: Negative / Urobili: <2 mg/dL   Blood: x / Protein: Trace / Nitrite: Positive   Leuk Esterase: Moderate / RBC: 8 /HPF / WBC 7 /HPF   Sq Epi: x / Non Sq Epi: 22 /HPF / Bacteria: Many    RECENT DIAGNOSTIC ORDERS:  Basic Metabolic Panel: 16:00 (22 @ 09:27)  Magnesium, Serum: AM Sched. Collection: 04-Aug-2022 04:30 (22 @ 08:34)  Comprehensive Metabolic Panel: AM Sched. Collection: 04-Aug-2022 04:30 (22 @ 08:34)  Complete Blood Count + Automated Diff: AM Sched. Collection: 04-Aug-2022 04:30 (22 @ 08:34)  Diet, Regular:   DASH/TLC Sodium & Cholesterol Restricted (22 @ 14:55)      MEDICATIONS:  MEDICATIONS  (STANDING):  cefTRIAXone   IVPB 1000 milliGRAM(s) IV Intermittent every 24 hours  cloZAPine 400 milliGRAM(s) Oral at bedtime  enoxaparin Injectable 40 milliGRAM(s) SubCutaneous every 24 hours  nicotine -   7 mG/24Hr(s) Patch 1 Patch Transdermal daily  pantoprazole    Tablet 40 milliGRAM(s) Oral before breakfast  polyethylene glycol 3350 17 Gram(s) Oral two times a day  senna 2 Tablet(s) Oral at bedtime    MEDICATIONS  (PRN):    HOME MEDICATIONS:  Clozaril ()  Navane ()    REVIEW OF SYSTEMS:  All other review of systems is negative unless indicated above.     PHYSICAL EXAM:  PHYSICAL EXAM:  GENERAL: NAD, well-developed  HEAD:  Atraumatic, Normocephalic  NECK: Supple, No JVD  CHEST/LUNG: Clear to auscultation bilaterally; No wheeze  HEART: Regular rate and rhythm; No murmurs, rubs, or gallops  ABDOMEN: Soft, Nontender, Nondistended; Bowel sounds present  EXTREMITIES:  2+ Peripheral Pulses, No clubbing, cyanosis, or edema  SKIN: No rashes or lesions

## 2022-08-03 NOTE — PROGRESS NOTE ADULT - ASSESSMENT
63 yo male w/ PMH of psychosis, GERD, HLD presents from [facility] for AMS due to hyponatremia    #Metabolic Encephalopathy, resolved  #Hyponatremia  Low serum Osmolality and low urine osmolality, d/t hypovolemia or psychogenic polydipsia  Nephrology on board  - Hold off on IVF  - Repeat BMP today (pt was refusing in the morning)  - Anticipate DC in 24h if NA stable around 132-133  - Pt downgraded to GMF  - nephro f/u    #Hx of psychosis  - COnt cloazpine 400mg qHs    #UTI  - Cont IV Rocephin 1g q24h    #COVID  Asymptomatic  - f/u ID regarding monoclonal ab/RDV    DVT PPX, Lovenox    #Progress Note Handoff  Pending (specify): Monitor Na  Family discussion: d/w pt regarding possible DC tomorrow if Na levels are stable  Disposition: Home 63 yo male w/ PMH of psychosis, GERD, HLD presents from [facility] for AMS due to hyponatremia    #Metabolic Encephalopathy, resolved  #Hyponatremia  Low serum Osmolality and low urine osmolality, d/t poor po intake or psychogenic polydipsia  Nephrology on board  - Hold off on IVF  - Repeat BMP today (pt was refusing in the morning)  - Anticipate DC in 24h if NA stable around 132-133  - Pt downgraded to GMF  - nephro f/u    #Hx of psychosis  - COnt cloazpine 400mg qHs    #UTI  - Cont IV Rocephin 1g q24h    #COVID  Asymptomatic  - f/u ID regarding monoclonal ab/RDV    DVT PPX, Lovenox    #Progress Note Handoff  Pending (specify): Monitor Na  Family discussion: d/w pt regarding possible DC tomorrow if Na levels are stable  Disposition: Home 61 yo male w/ PMH of psychosis, GERD, HLD presents from [facility] for AMS due to hyponatremia    #Metabolic Encephalopathy, resolved  #Hyponatremia  Low serum Osmolality and low urine osmolality, d/t poor po intake or psychogenic polydipsia  Nephrology on board  - Hold off on IVF  - Repeat BMP today (pt was refusing in the morning)  - Anticipate DC in 24h if NA stable around 132-133  - Pt downgraded to GMF  - nephro f/u    #Schizophrenia  - Cont cloazpine 400mg qHs    #UTI  - Cont IV Rocephin 1g q24h    #COVID  Asymptomatic  - f/u ID regarding monoclonal ab/RDV    DVT PPX, Lovenox    #Progress Note Handoff  Pending (specify): Monitor Na  Family discussion: d/w pt regarding possible DC tomorrow if Na levels are stable  Disposition: Home

## 2022-08-03 NOTE — CONSULT NOTE ADULT - ASSESSMENT
IMPRESSION:    63 yo male w/ PMH of psychosis, GERD, HLD presents for AMS, found to have hyponatremia with Na- 115, COVID+, and UTI    #UTI  UA positive. Pt denies dysuria, or change in urinary frequency. Currently on Rocephin 1gm qd IV.     -F/u urine cx    #COVID-19  Satting 94% on RA. Afebrile. CXR negative.    - Keep POx > 94%.   - Monitor respiratory status with improvement in mental status    THIS NOTE IS INCOMPLETE IMPRESSION:    61 yo male w/ PMH of psychosis, GERD, HLD presents for AMS, found to have hyponatremia with Na- 115, COVID+, and UTI    #UTI  UA positive. Pt denies dysuria, or change in urinary frequency. Given absence of systemic symptoms, pt likely has an uncomplicated cystitis Currently on Rocephin 1gm qd IV.     - Can switch ceftriaxone to Nitrofurantoin (Macrobid) 100mg bid - will discuss with attending during afternoon rounds   - F/u urine cx    #COVID-19  Satting 94% on RA. Pt desaturates to 80s when speaking. Afebrile. CXR negative. 44 year smoking history.     Although pt does not have severe disease, pt is 61yo and has extensive smoking history which makes him a candidate for remdesivir.    - Remdesivir 200mg IV today, and 100mg qd afterward for 4 days  - Keep POx > 94%.   - Monitor respiratory status with improvement in mental status    THIS NOTE IS INCOMPLETE IMPRESSION:    63 yo male w/ PMH of psychosis, GERD, HLD presents for AMS, found to have hyponatremia with Na- 115, COVID+, and UTI    #UTI  UA positive. Pt denies dysuria, or change in urinary frequency. Given absence of systemic symptoms, pt likely has an uncomplicated cystitis Currently on Rocephin 1gm qd IV.     - Can current dose of ceftriaxone for 3 days total (today 8/3 is day 2, cont for 1 more day)  - F/u urine cx    #COVID-19  Satting 94% on RA. Pt desaturates to 80s when speaking. Afebrile. CXR negative. 44 year smoking history.     Although pt does not have severe disease, pt is 61yo and has extensive smoking history which makes him a candidate for remdesivir.    - Remdesivir 200mg IV today, and 100mg qd afterward for 4 days  - Keep POx > 94%.   - Monitor respiratory status with improvement in mental status    PLAN DISCUSSED WITH ID ATTENDING IMPRESSION:    63 yo male w/ PMH of psychosis, GERD, HLD presents for AMS, found to have hyponatremia with Na- 115, COVID+, and UTI    #Possible UTI   UA positive. Pt denies dysuria, or change in urinary frequency. Given absence of systemic symptoms, pt likely has an uncomplicated cystitis Currently on Rocephin 1gm qd IV.     - Can current dose of ceftriaxone for 3 days total (today 8/3 is day 2, cont for 1 more day)  - F/u urine cx    #COVID-19 - moderate  Satting 94% on RA. Pt desaturates to 80s when speaking. Afebrile. CXR negative. 44 year smoking history.     Recommendations  - Remdesivir for 3 days   - Keep POx > 94%.   - Monitor respiratory status with improvement in mental status    Please call or message on Microsoft Teams if with any questions.  Spectra [phone]

## 2022-08-03 NOTE — CONSULT NOTE ADULT - ATTENDING COMMENTS
63 yo male w/ PMH of psychosis, GERD, HLD presents from [facility] for AMS. Last time seen normal was yesterday. Stroke work up negative, patient found to be in hyponatremia with encephalopathy. he received 2.5 L of NS with overcorrection of his Na levels.     # hyponatremia hyposomolar with low U Na and U Osm around 160 / c/w hypovolemia? low solute intake / covid  serum solium overcorrected in less of 12h  agree with D5w at 100 cc per hour target serum sodium 124-125 tonight and 132 in AM   may need DDAVP if serum sodium remains high tonight  BMP qshift / check TSH   will follow
63 yo male w/ PMH of psychosis and HLD presents for AMS    #AMS, multifactorial   #Severe Hyponatremia  #COVID-19, moderate    Recommendations  - noted to be dropping O2 saturations during conversation   - can give RDV for 3 day course -- extend to 5 days if becomes truly hypoxic   - otherwise supportive care    Please call or message on Microsoft Teams if with any questions.  Spectra [phone]

## 2022-08-03 NOTE — CONSULT NOTE ADULT - SUBJECTIVE AND OBJECTIVE BOX
ELIEL GUERRERO 62y Male  MRN#: [id number]     Hospital Day: 2d    Pt is currently admitted with the primary diagnosis of symptomatic hyponatremia.    SUBJECTIVE    63 yo male w/ PMH of psychosis and HLD presents for AMS. Pt is AO at baseline. Staff noticed that he was unresponsive.     In the ED VS were stable slightly afebrile (35.6C), 95% on RA.  BG was 120.  Stroke code was called on arrival. He was found to be awake, not responding to questions, Right pupil 4mm, left pupil 2mm. CT head and CTA were both negative. Labs were remarkable for Sodium (115), COVID-19 (+), and UA positive. Patient was given 1x 1G IV ceftriaxone, and NS 2L bolus. Sodium subsequently improved to 117 in 3 hours.    Patient is being admitted to ICU for severe hyponatremia with encephalopathy.                                               ----------------------------------------------------------  OBJECTIVE  PAST MEDICAL & SURGICAL HISTORY  Schizophrenia    Hyperlipidemia    GERD (gastroesophageal reflux disease)                                              -----------------------------------------------------------  ALLERGIES:  No Known Allergies                                            ------------------------------------------------------------    HOME MEDICATIONS  Home Medications:  Clozaril:  (02 Aug 2022 02:01)  Navane:  (02 Aug 2022 02:01)                           MEDICATIONS:  STANDING MEDICATIONS  cefTRIAXone   IVPB 1000 milliGRAM(s) IV Intermittent every 24 hours  cloZAPine 400 milliGRAM(s) Oral at bedtime  enoxaparin Injectable 40 milliGRAM(s) SubCutaneous every 24 hours  nicotine -   7 mG/24Hr(s) Patch 1 Patch Transdermal daily  pantoprazole    Tablet 40 milliGRAM(s) Oral before breakfast  polyethylene glycol 3350 17 Gram(s) Oral two times a day  senna 2 Tablet(s) Oral at bedtime    PRN MEDICATIONS                                            ------------------------------------------------------------  VITAL SIGNS: Last 24 Hours  T(C): 36.3 (02 Aug 2022 18:00), Max: 36.7 (02 Aug 2022 11:30)  T(F): 97.3 (02 Aug 2022 18:00), Max: 98.1 (02 Aug 2022 11:30)  HR: 48 (03 Aug 2022 06:00) (48 - 76)  BP: 119/58 (03 Aug 2022 06:00) (91/59 - 142/71)  BP(mean): 84 (03 Aug 2022 01:01) (84 - 95)  RR: 18 (03 Aug 2022 06:00) (16 - 18)  SpO2: 94% (03 Aug 2022 06:00) (94% - 99%)                                             --------------------------------------------------------------  LABS:                        14.9   5.98  )-----------( 172      ( 02 Aug 2022 07:00 )             41.9     08-02    131<L>  |  99  |  8<L>  ----------------------------<  128<H>  4.9   |  23  |  0.7    Ca    7.9<L>      02 Aug 2022 21:58  Mg     2.0     08-02    TPro  5.7<L>  /  Alb  3.6  /  TBili  0.4  /  DBili  x   /  AST  32  /  ALT  20  /  AlkPhos  68  08-02      Urinalysis Basic - ( 01 Aug 2022 19:40 )    Color: Light Yellow / Appearance: Clear / S.017 / pH: x  Gluc: x / Ketone: Small  / Bili: Negative / Urobili: <2 mg/dL   Blood: x / Protein: Trace / Nitrite: Positive   Leuk Esterase: Moderate / RBC: 8 /HPF / WBC 7 /HPF   Sq Epi: x / Non Sq Epi: 22 /HPF / Bacteria: Many    Culture - Urine (collected 01 Aug 2022 19:40)  Source: Clean Catch Clean Catch (Midstream)  Preliminary Report (02 Aug 2022 22:39):    >100,000 CFU/ml Gram Negative Rods    CARDIAC MARKERS ( 02 Aug 2022 07:00 )  x     / x     / 578 U/L / x     / x      CARDIAC MARKERS ( 01 Aug 2022 18:07 )  x     / <0.01 ng/mL / x     / x     / x                                                  -------------------------------------------------------------  RADIOLOGY:                                            --------------------------------------------------------------    PHYSICAL EXAM:  PHYSICAL EXAM:  GENERAL: NAD, lying in bed comfortably  HEAD:  Atraumatic, Normocephalic  EYES: EOMI, pupils Right 4mm > Left 2mm, equally reactive b/l, conjunctiva and sclera clear  ENT: Moist mucous membranes  NECK: Supple, No JVD  CHEST/LUNG: Clear to auscultation bilaterally; No rales, rhonchi, wheezing, or rubs. Unlabored respirations  HEART: Regular rate and rhythm; No murmurs, rubs, or gallops  ABDOMEN: Bowel sounds present; Soft, Nontender, Nondistended. No hepatomegaly  EXTREMITIES:  2+ Peripheral Pulses, brisk capillary refill. No clubbing, cyanosis, or edema  NERVOUS SYSTEM:  Alert & Oriented X3, speech clear. No deficits   MSK: FROM all 4 extremities, full and equal strength  SKIN: No rashes or lesions                                             --------------------------------------------------------------       ELIEL GUERRERO 62y Male  MRN#: [id number]     Hospital Day: 2d    Pt is currently admitted with the primary diagnosis of symptomatic hyponatremia.    SUBJECTIVE    Progress: No acute events overnight. Pt denies dysuria or changes in urinary frequency. Pt does report mild difficulty in breathing. Pt is a current smoker and has been smoking for 44 years.    HPI: 63 yo male w/ PMH of psychosis and HLD presents for AMS. Pt is AO at baseline. Staff noticed that he was unresponsive.     In the ED VS were stable slightly afebrile (35.6C), 95% on RA.  BG was 120.  Stroke code was called on arrival. He was found to be awake, not responding to questions, Right pupil 4mm, left pupil 2mm. CT head and CTA were both negative. Labs were remarkable for Sodium (115), COVID-19 (+), and UA positive. Patient was given 1x 1G IV ceftriaxone, and NS 2L bolus. Sodium subsequently improved to 117 in 3 hours.    Patient is being admitted to ICU for severe hyponatremia with encephalopathy.                                               ----------------------------------------------------------  OBJECTIVE  PAST MEDICAL & SURGICAL HISTORY  Schizophrenia    Hyperlipidemia    GERD (gastroesophageal reflux disease)                                              -----------------------------------------------------------  ALLERGIES:  No Known Allergies                                            ------------------------------------------------------------    HOME MEDICATIONS  Home Medications:  Clozaril:  (02 Aug 2022 02:01)  Navane:  (02 Aug 2022 02:01)                           MEDICATIONS:  STANDING MEDICATIONS  cefTRIAXone   IVPB 1000 milliGRAM(s) IV Intermittent every 24 hours  cloZAPine 400 milliGRAM(s) Oral at bedtime  enoxaparin Injectable 40 milliGRAM(s) SubCutaneous every 24 hours  nicotine -   7 mG/24Hr(s) Patch 1 Patch Transdermal daily  pantoprazole    Tablet 40 milliGRAM(s) Oral before breakfast  polyethylene glycol 3350 17 Gram(s) Oral two times a day  senna 2 Tablet(s) Oral at bedtime    PRN MEDICATIONS                                            ------------------------------------------------------------  VITAL SIGNS: Last 24 Hours  T(C): 36.3 (02 Aug 2022 18:00), Max: 36.7 (02 Aug 2022 11:30)  T(F): 97.3 (02 Aug 2022 18:00), Max: 98.1 (02 Aug 2022 11:30)  HR: 48 (03 Aug 2022 06:00) (48 - 76)  BP: 119/58 (03 Aug 2022 06:00) (91/59 - 142/71)  BP(mean): 84 (03 Aug 2022 01:01) (84 - 95)  RR: 18 (03 Aug 2022 06:00) (16 - 18)  SpO2: 94% (03 Aug 2022 06:00) (94% - 99%)                                             --------------------------------------------------------------  LABS:                        14.9   5.98  )-----------( 172      ( 02 Aug 2022 07:00 )             41.9     08-02    131<L>  |  99  |  8<L>  ----------------------------<  128<H>  4.9   |  23  |  0.7    Ca    7.9<L>      02 Aug 2022 21:58  Mg     2.0     08-02    TPro  5.7<L>  /  Alb  3.6  /  TBili  0.4  /  DBili  x   /  AST  32  /  ALT  20  /  AlkPhos  68  08-02      Urinalysis Basic - ( 01 Aug 2022 19:40 )    Color: Light Yellow / Appearance: Clear / S.017 / pH: x  Gluc: x / Ketone: Small  / Bili: Negative / Urobili: <2 mg/dL   Blood: x / Protein: Trace / Nitrite: Positive   Leuk Esterase: Moderate / RBC: 8 /HPF / WBC 7 /HPF   Sq Epi: x / Non Sq Epi: 22 /HPF / Bacteria: Many    Culture - Urine (collected 01 Aug 2022 19:40)  Source: Clean Catch Clean Catch (Midstream)  Preliminary Report (02 Aug 2022 22:39):    >100,000 CFU/ml Gram Negative Rods    CARDIAC MARKERS ( 02 Aug 2022 07:00 )  x     / x     / 578 U/L / x     / x      CARDIAC MARKERS ( 01 Aug 2022 18:07 )  x     / <0.01 ng/mL / x     / x     / x                                                  -------------------------------------------------------------  RADIOLOGY:                                            --------------------------------------------------------------    PHYSICAL EXAM:  PHYSICAL EXAM:  GENERAL: NAD, lying in bed comfortably  HEAD:  Atraumatic, Normocephalic  EYES: EOMI, pupils Right 4mm > Left 2mm, equally reactive b/l, conjunctiva and sclera clear  ENT: Moist mucous membranes  NECK: Supple, No JVD  CHEST/LUNG: O2 desaturates when pt is speaking. Clear to auscultation bilaterally; No rales, rhonchi, wheezing, or rubs. Unlabored respirations.  HEART: Regular rate and rhythm; No murmurs, rubs, or gallops  ABDOMEN: Bowel sounds present; Soft, Nontender, Nondistended. No hepatomegaly  EXTREMITIES:  2+ Peripheral Pulses, brisk capillary refill. No clubbing, cyanosis, or edema  NERVOUS SYSTEM:  Alert & Oriented X3, speech clear. No deficits   MSK: FROM all 4 extremities, full and equal strength  SKIN: No rashes or lesions                                             --------------------------------------------------------------

## 2022-08-03 NOTE — PATIENT PROFILE ADULT - FALL HARM RISK - HARM RISK INTERVENTIONS

## 2022-08-03 NOTE — PROGRESS NOTE ADULT - ATTENDING COMMENTS
Patient seen and examined  events noted  # hyponatremia corrected   serum sodium noted today  nutrition eval  limit free water to 1.5 l per day   will sign off recall PRN / call using TEAMS or on [phone]

## 2022-08-03 NOTE — PROGRESS NOTE ADULT - SUBJECTIVE AND OBJECTIVE BOX
Over Night Events: events noted, renal reviewed, on d 5w    PHYSICAL EXAM    ICU Vital Signs Last 24 Hrs  T(C): 36.3 (02 Aug 2022 18:00), Max: 36.7 (02 Aug 2022 07:12)  T(F): 97.3 (02 Aug 2022 18:00), Max: 98.1 (02 Aug 2022 07:12)  HR: 60 (03 Aug 2022 01:01) (58 - 76)  BP: 115/66 (03 Aug 2022 01:01) (81/53 - 142/71)  RR: 16 (03 Aug 2022 01:01) (16 - 20)  SpO2: 96% (03 Aug 2022 01:01) (95% - 99%)    O2 Parameters below as of 03 Aug 2022 01:01  Patient On (Oxygen Delivery Method): room air            General: comfortable   Lungs: Bilateral BS  Cardiovascular: Regular   Abdomen: Soft, Positive BS  Extremities: No clubbing   Skin: Warm  Neurological: Non focal       22 @ 07:01  -  22 @ 07:00  --------------------------------------------------------  IN:    IV PiggyBack: 50 mL    Sodium Chloride 0.9% Bolus: 2000 mL  Total IN: 2050 mL    OUT:    Voided (mL): 4000 mL  Total OUT: 4000 mL    Total NET: -1950 mL          LABS:                          14.9   5.98  )-----------( 172      ( 02 Aug 2022 07:00 )             41.9                                               08-02    131<L>  |  99  |  8<L>  ----------------------------<  128<H>  4.9   |  23  |  0.7    Ca    7.9<L>      02 Aug 2022 21:58  Mg     2.0     08-    TPro  5.7<L>  /  Alb  3.6  /  TBili  0.4  /  DBili  x   /  AST  32  /  ALT  20  /  AlkPhos  68  08-                                             Urinalysis Basic - ( 01 Aug 2022 19:40 )    Color: Light Yellow / Appearance: Clear / S.017 / pH: x  Gluc: x / Ketone: Small  / Bili: Negative / Urobili: <2 mg/dL   Blood: x / Protein: Trace / Nitrite: Positive   Leuk Esterase: Moderate / RBC: 8 /HPF / WBC 7 /HPF   Sq Epi: x / Non Sq Epi: 22 /HPF / Bacteria: Many        CARDIAC MARKERS ( 02 Aug 2022 07:00 )  x     / x     / 578 U/L / x     / x      CARDIAC MARKERS ( 01 Aug 2022 18:07 )  x     / <0.01 ng/mL / x     / x     / x                                                LIVER FUNCTIONS - ( 02 Aug 2022 21:58 )  Alb: 3.6 g/dL / Pro: 5.7 g/dL / ALK PHOS: 68 U/L / ALT: 20 U/L / AST: 32 U/L / GGT: x                                                  Culture - Urine (collected 01 Aug 2022 19:40)  Source: Clean Catch Clean Catch (Midstream)  Preliminary Report (02 Aug 2022 22:39):    >100,000 CFU/ml Gram Negative Rods                                                                                           MEDICATIONS  (STANDING):  cefTRIAXone   IVPB 1000 milliGRAM(s) IV Intermittent every 24 hours  cloZAPine 400 milliGRAM(s) Oral at bedtime  dextrose 5%. 1000 milliLiter(s) (100 mL/Hr) IV Continuous <Continuous>  enoxaparin Injectable 40 milliGRAM(s) SubCutaneous every 24 hours  nicotine -   7 mG/24Hr(s) Patch 1 Patch Transdermal daily  pantoprazole    Tablet 40 milliGRAM(s) Oral before breakfast  polyethylene glycol 3350 17 Gram(s) Oral two times a day  senna 2 Tablet(s) Oral at bedtime

## 2022-08-03 NOTE — PROGRESS NOTE ADULT - ASSESSMENT
63 yo male w/ PMH of psychosis, GERD, HLD presents from [facility] for AMS. Last time seen normal was yesterday. Stroke work up negative, patient found to be in hyponatremia with encephalopathy. he received 2.5 L of NS with overcorrection of his Na levels. Nephro consult    Assessment and plan  Hyponatremia overcorrected/ Schizophernia  Hyponatremia initially secondary to low solute intake, low urine osm with low urine Na  s/p 2.5 L of NS with overcorrection 115-> 137 in 12 hours  patient started on D5W infusion at 100 cc/hr with repeated Na level 131  stop D5W, Na at goal  will sign off, recall prn

## 2022-08-04 VITALS
DIASTOLIC BLOOD PRESSURE: 72 MMHG | TEMPERATURE: 98 F | HEART RATE: 74 BPM | RESPIRATION RATE: 18 BRPM | SYSTOLIC BLOOD PRESSURE: 122 MMHG

## 2022-08-04 LAB
-  AMIKACIN: SIGNIFICANT CHANGE UP
-  AMOXICILLIN/CLAVULANIC ACID: SIGNIFICANT CHANGE UP
-  AMPICILLIN/SULBACTAM: SIGNIFICANT CHANGE UP
-  AMPICILLIN: SIGNIFICANT CHANGE UP
-  AZTREONAM: SIGNIFICANT CHANGE UP
-  CEFAZOLIN: SIGNIFICANT CHANGE UP
-  CEFEPIME: SIGNIFICANT CHANGE UP
-  CEFOXITIN: SIGNIFICANT CHANGE UP
-  CEFTRIAXONE: SIGNIFICANT CHANGE UP
-  CIPROFLOXACIN: SIGNIFICANT CHANGE UP
-  ERTAPENEM: SIGNIFICANT CHANGE UP
-  GENTAMICIN: SIGNIFICANT CHANGE UP
-  IMIPENEM: SIGNIFICANT CHANGE UP
-  LEVOFLOXACIN: SIGNIFICANT CHANGE UP
-  MEROPENEM: SIGNIFICANT CHANGE UP
-  NITROFURANTOIN: SIGNIFICANT CHANGE UP
-  PIPERACILLIN/TAZOBACTAM: SIGNIFICANT CHANGE UP
-  TIGECYCLINE: SIGNIFICANT CHANGE UP
-  TOBRAMYCIN: SIGNIFICANT CHANGE UP
-  TRIMETHOPRIM/SULFAMETHOXAZOLE: SIGNIFICANT CHANGE UP
ALBUMIN SERPL ELPH-MCNC: 3.9 G/DL — SIGNIFICANT CHANGE UP (ref 3.5–5.2)
ALP SERPL-CCNC: 73 U/L — SIGNIFICANT CHANGE UP (ref 30–115)
ALT FLD-CCNC: 18 U/L — SIGNIFICANT CHANGE UP (ref 0–41)
ANION GAP SERPL CALC-SCNC: 6 MMOL/L — LOW (ref 7–14)
AST SERPL-CCNC: 16 U/L — SIGNIFICANT CHANGE UP (ref 0–41)
BASOPHILS # BLD AUTO: 0.03 K/UL — SIGNIFICANT CHANGE UP (ref 0–0.2)
BASOPHILS NFR BLD AUTO: 0.5 % — SIGNIFICANT CHANGE UP (ref 0–1)
BILIRUB SERPL-MCNC: <0.2 MG/DL — SIGNIFICANT CHANGE UP (ref 0.2–1.2)
BUN SERPL-MCNC: 10 MG/DL — SIGNIFICANT CHANGE UP (ref 10–20)
CALCIUM SERPL-MCNC: 8.9 MG/DL — SIGNIFICANT CHANGE UP (ref 8.5–10.1)
CHLORIDE SERPL-SCNC: 107 MMOL/L — SIGNIFICANT CHANGE UP (ref 98–110)
CO2 SERPL-SCNC: 26 MMOL/L — SIGNIFICANT CHANGE UP (ref 17–32)
CREAT SERPL-MCNC: 0.8 MG/DL — SIGNIFICANT CHANGE UP (ref 0.7–1.5)
CULTURE RESULTS: SIGNIFICANT CHANGE UP
EGFR: 99 ML/MIN/1.73M2 — SIGNIFICANT CHANGE UP
EOSINOPHIL # BLD AUTO: 0 K/UL — SIGNIFICANT CHANGE UP (ref 0–0.7)
EOSINOPHIL NFR BLD AUTO: 0 % — SIGNIFICANT CHANGE UP (ref 0–8)
GLUCOSE SERPL-MCNC: 100 MG/DL — HIGH (ref 70–99)
HCT VFR BLD CALC: 38.3 % — LOW (ref 42–52)
HGB BLD-MCNC: 13.6 G/DL — LOW (ref 14–18)
IMM GRANULOCYTES NFR BLD AUTO: 0.7 % — HIGH (ref 0.1–0.3)
LYMPHOCYTES # BLD AUTO: 2.1 K/UL — SIGNIFICANT CHANGE UP (ref 1.2–3.4)
LYMPHOCYTES # BLD AUTO: 36.1 % — SIGNIFICANT CHANGE UP (ref 20.5–51.1)
MAGNESIUM SERPL-MCNC: 1.9 MG/DL — SIGNIFICANT CHANGE UP (ref 1.8–2.4)
MCHC RBC-ENTMCNC: 30.9 PG — SIGNIFICANT CHANGE UP (ref 27–31)
MCHC RBC-ENTMCNC: 35.5 G/DL — SIGNIFICANT CHANGE UP (ref 32–37)
MCV RBC AUTO: 87 FL — SIGNIFICANT CHANGE UP (ref 80–94)
METHOD TYPE: SIGNIFICANT CHANGE UP
MONOCYTES # BLD AUTO: 0.89 K/UL — HIGH (ref 0.1–0.6)
MONOCYTES NFR BLD AUTO: 15.3 % — HIGH (ref 1.7–9.3)
NEUTROPHILS # BLD AUTO: 2.75 K/UL — SIGNIFICANT CHANGE UP (ref 1.4–6.5)
NEUTROPHILS NFR BLD AUTO: 47.4 % — SIGNIFICANT CHANGE UP (ref 42.2–75.2)
NRBC # BLD: 0 /100 WBCS — SIGNIFICANT CHANGE UP (ref 0–0)
ORGANISM # SPEC MICROSCOPIC CNT: SIGNIFICANT CHANGE UP
ORGANISM # SPEC MICROSCOPIC CNT: SIGNIFICANT CHANGE UP
PLATELET # BLD AUTO: 200 K/UL — SIGNIFICANT CHANGE UP (ref 130–400)
POTASSIUM SERPL-MCNC: 4.1 MMOL/L — SIGNIFICANT CHANGE UP (ref 3.5–5)
POTASSIUM SERPL-SCNC: 4.1 MMOL/L — SIGNIFICANT CHANGE UP (ref 3.5–5)
PROT SERPL-MCNC: 6.1 G/DL — SIGNIFICANT CHANGE UP (ref 6–8)
RBC # BLD: 4.4 M/UL — LOW (ref 4.7–6.1)
RBC # FLD: 12.3 % — SIGNIFICANT CHANGE UP (ref 11.5–14.5)
SODIUM SERPL-SCNC: 139 MMOL/L — SIGNIFICANT CHANGE UP (ref 135–146)
SPECIMEN SOURCE: SIGNIFICANT CHANGE UP
WBC # BLD: 5.81 K/UL — SIGNIFICANT CHANGE UP (ref 4.8–10.8)
WBC # FLD AUTO: 5.81 K/UL — SIGNIFICANT CHANGE UP (ref 4.8–10.8)

## 2022-08-04 PROCEDURE — 99239 HOSP IP/OBS DSCHRG MGMT >30: CPT

## 2022-08-04 RX ORDER — MISOPROSTOL 200 UG/1
1 TABLET ORAL
Qty: 0 | Refills: 0 | DISCHARGE
Start: 2022-08-04

## 2022-08-04 RX ORDER — THIOTHIXENE 5 MG
2 CAPSULE ORAL
Qty: 0 | Refills: 0 | DISCHARGE
Start: 2022-08-04

## 2022-08-04 RX ORDER — DOCUSATE SODIUM 100 MG
1 CAPSULE ORAL
Qty: 0 | Refills: 0 | DISCHARGE

## 2022-08-04 RX ORDER — THIOTHIXENE 5 MG
0 CAPSULE ORAL
Qty: 0 | Refills: 0 | DISCHARGE

## 2022-08-04 RX ORDER — CEFPODOXIME PROXETIL 100 MG
1 TABLET ORAL
Qty: 10 | Refills: 0
Start: 2022-08-04 | End: 2022-08-08

## 2022-08-04 RX ORDER — SENNA PLUS 8.6 MG/1
2 TABLET ORAL
Qty: 0 | Refills: 0 | DISCHARGE
Start: 2022-08-04

## 2022-08-04 RX ORDER — CLOZAPINE 150 MG/1
0 TABLET, ORALLY DISINTEGRATING ORAL
Qty: 0 | Refills: 0 | DISCHARGE

## 2022-08-04 RX ORDER — ASPIRIN/CALCIUM CARB/MAGNESIUM 324 MG
1 TABLET ORAL
Qty: 0 | Refills: 0 | DISCHARGE

## 2022-08-04 RX ORDER — CLOZAPINE 150 MG/1
1 TABLET, ORALLY DISINTEGRATING ORAL
Qty: 0 | Refills: 0 | DISCHARGE
Start: 2022-08-04

## 2022-08-04 RX ORDER — CLOZAPINE 150 MG/1
4 TABLET, ORALLY DISINTEGRATING ORAL
Qty: 0 | Refills: 0 | DISCHARGE
Start: 2022-08-04

## 2022-08-04 RX ORDER — PANTOPRAZOLE SODIUM 20 MG/1
1 TABLET, DELAYED RELEASE ORAL
Qty: 0 | Refills: 0 | DISCHARGE
Start: 2022-08-04

## 2022-08-04 RX ORDER — REMDESIVIR 5 MG/ML
200 INJECTION INTRAVENOUS EVERY 24 HOURS
Refills: 0 | Status: COMPLETED | OUTPATIENT
Start: 2022-08-04 | End: 2022-08-04

## 2022-08-04 RX ORDER — REMDESIVIR 5 MG/ML
INJECTION INTRAVENOUS
Refills: 0 | Status: DISCONTINUED | OUTPATIENT
Start: 2022-08-04 | End: 2022-08-04

## 2022-08-04 RX ADMIN — REMDESIVIR 200 MILLIGRAM(S): 5 INJECTION INTRAVENOUS at 10:51

## 2022-08-04 RX ADMIN — PANTOPRAZOLE SODIUM 40 MILLIGRAM(S): 20 TABLET, DELAYED RELEASE ORAL at 06:03

## 2022-08-04 RX ADMIN — POLYETHYLENE GLYCOL 3350 17 GRAM(S): 17 POWDER, FOR SOLUTION ORAL at 17:52

## 2022-08-04 RX ADMIN — CEFTRIAXONE 100 MILLIGRAM(S): 500 INJECTION, POWDER, FOR SOLUTION INTRAMUSCULAR; INTRAVENOUS at 06:03

## 2022-08-04 RX ADMIN — POLYETHYLENE GLYCOL 3350 17 GRAM(S): 17 POWDER, FOR SOLUTION ORAL at 06:03

## 2022-08-04 RX ADMIN — CLOZAPINE 100 MILLIGRAM(S): 150 TABLET, ORALLY DISINTEGRATING ORAL at 06:03

## 2022-08-04 RX ADMIN — ENOXAPARIN SODIUM 40 MILLIGRAM(S): 100 INJECTION SUBCUTANEOUS at 08:21

## 2022-08-04 RX ADMIN — Medication 20 MILLIGRAM(S): at 21:12

## 2022-08-04 RX ADMIN — CLOZAPINE 100 MILLIGRAM(S): 150 TABLET, ORALLY DISINTEGRATING ORAL at 14:33

## 2022-08-04 RX ADMIN — SENNA PLUS 2 TABLET(S): 8.6 TABLET ORAL at 21:12

## 2022-08-04 RX ADMIN — CLOZAPINE 100 MILLIGRAM(S): 150 TABLET, ORALLY DISINTEGRATING ORAL at 17:52

## 2022-08-04 RX ADMIN — Medication 1 PATCH: at 14:33

## 2022-08-04 NOTE — DISCHARGE NOTE NURSING/CASE MANAGEMENT/SOCIAL WORK - NSDCPEFALRISK_GEN_ALL_CORE
For information on Fall & Injury Prevention, visit: [email or web address] OR  [email or web address] OR  https://www.cdc.gov/steadi/patient.html

## 2022-08-04 NOTE — DISCHARGE NOTE PROVIDER - NSDCCPCAREPLAN_GEN_ALL_CORE_FT
PRINCIPAL DISCHARGE DIAGNOSIS  Diagnosis: Hyponatremia  Assessment and Plan of Treatment: Your presenting altered mental status was likely from low sodium levels from dehydration. Your sodium levels were corrected and your are subsequently cleared for discharge back to [facility].       PRINCIPAL DISCHARGE DIAGNOSIS  Diagnosis: Hyponatremia  Assessment and Plan of Treatment: Your presenting altered mental status was likely from low sodium levels from dehydration. Your sodium levels were corrected and your are subsequently cleared for discharge back to [facility].      SECONDARY DISCHARGE DIAGNOSES  Diagnosis: Urinary tract infection  Assessment and Plan of Treatment: You were found to have a UTI. You were started on antibiotics and cleared for discharge with 5 more days to complete the course.

## 2022-08-04 NOTE — PROGRESS NOTE ADULT - ASSESSMENT
[facility] CARDIOLOGY PROGRESS NOTE       Date: 10/16/2019 Admission Date: 10/15/2019   YOB: 1973 Attending: Julian Del Rosario DO   MRN: [id number]      FOLLOW UP ON: 10/16/19    S:     Chago Cottrell is a 46 year old male who was admitted on 10/15/2019 for STEMI and intermittent heart block. He has a history of HTN, pancreatitis, possible bicuspid aortic valve on echo in 2018, and former smoker.    He presented to the emergency department on 10/15, with chest pain that have been going on and off for 3 days. He had associated fatigue and lightheadedness. In the 24 hours prior to admission his symptoms of fatigue and lightheadedness worsened. EKG showed intermittent heart block with ST elevations inferiorly with possible reciprocal changes. Area catheterization and TVP placement were done emergently. This showed multivessel disease including multiple lesions to the RCA, long mid LAD 85% stenosis, and large mid ramus lesion of 85%. CT surgery was consulted. He was loaded with Brilinta on 10/15. TVP was placed and heart rates were in the 30s to 40s overnight. All AV giovanny blocking agents were stopped including atenolol and clonidine.  His heart rates this morning have increased to the low 50s. Echocardiogram was ordered and showed an EF of 60%, with evidence of inferior and inferolateral infarct with RV involvement, calcified the AV with no stenosis and mild AI. Ascending aorta measures 4.2 cm.  He is currently of Integrelin.       Denies chest pain, shortness of breath, dizziness, lightheadedness, edema, orthopnea, or palpitations.  No problems with the right groin/TVP site.      O:   Vital 24 Hour Range Most Recent Value   Temperature Temp  Min: 98 °F (36.7 °C)  Max: 98.5 °F (36.9 °C) 98.1 °F (36.7 °C)   Pulse Pulse  Min: 39  Max: 150 (!) 48   Respiratory Resp  Min: 16  Max: 41 21   Blood Pressure BP  Min: 83/60  Max: 125/59 106/58   Pulse Oximetry SpO2  Min: 88 %  Max: 100 %    O2 O2 Flow Rate (L/min)   63 yo male w/ PMH of psychosis, GERD, HLD presents from [facility] for AMS due to hyponatremia    #Metabolic Encephalopathy, resolved  #Hyponatremia  Low serum Osmolality and low urine osmolality, d/t poor po intake or psychogenic polydipsia  Nephrology on board  -  sodium is now normal    #Schizophrenia  - Cont cloazpine 400mg qHs    #UTI  - Cont IV Rocephin 1g q24h in patient-- klebsiella in urine cx--can be given vantin at DC  has a mujica and will do trail of void if mujica is not chronic.    #COVID  Asymptomatic  ID suggested RDV for 3 days-- will do it if patient stays and has worsening symptoms    DC plan to Group home today-- spent more than 30mins   Avg: 3 L/min  Min: 3 L/min   Min taken time: 10/15/19 1601  Max: 3 L/min   Max taken time: 10/15/19 1601      Vital Most Recent Value First Value   Weight 101.3 kg Weight: 103.1 kg   Height 5' 9\" (175.3 cm) Height: 5' 9\" (175.3 cm)     Intake/Output:  I/O last 3 completed shifts:  In: 2275 [I.V.:244; IV Piggyback:2031]  Out: 400 [Urine:400]      Intake/Output Summary (Last 24 hours) at 10/16/2019 0933  Last data filed at 10/16/2019 0400  Gross per 24 hour   Intake 2275 ml   Output 400 ml   Net 1875 ml          SCHEDULED MEDICATIONS     • lactobacillus acidophilus  1 tablet Oral BID WC   • sodium chloride (PF)  2 mL Intracatheter 2 times per day   • aspirin  81 mg Oral Daily   • atorvastatin  80 mg Oral Nightly        Physical Exam     Constitutional:  White  male in no acute distress.  Skin: Warm and dry.    Neck: No JVD.     Lung:   No respiratory distress. Normal breath sounds. No crackles, wheezing or rhonchi.  Heart:  Bradycardic, regular rhythm.  Normal S1, S2.   No murmur, gallop, or rub.  Abdomen:  Normal bowel sounds.  Soft, non-tender.    Extremities:  No clubbing, cyanosis or edema.  2+ dorsalis pedis pulses bilaterally.  Right groin is without swelling or hematoma.  Neurologic:  Normal affect and mood.  No gross motor deficits.       LABS      Recent Labs   Lab 10/15/19  1550   SODIUM 133*   POTASSIUM 4.6   CHLORIDE 101   CO2 24   BUN 22*   CREATININE 2.00*   GLUCOSE 118*   ALBUMIN 3.6   *   BILIRUBIN 2.5*       Recent Labs   Lab 10/15/19  1839 10/15/19  1550   WBC  --  15.7*   HGB  --  15.2   HCT  --  45.9    264   MCV  --  93.1       No results found for: CHOLESTEROL, TRIGLYCERIDE, HDL, CALCLDL     TROPONIN I (ng/mL)   Date Value   10/15/2019 3.93 (HH)       PTT (sec)   Date Value   10/15/2019 29   03/22/2018 48 (H)   03/21/2018 47 (H)     PROTIME (sec)   Date Value   10/15/2019 11.7   03/19/2018 13.4 (H)   03/18/2018 11.7     INR (no units)   Date Value   10/15/2019 1.1   03/19/2018 1.3    03/18/2018 1.2         IMAGING       ECHOCARDIOGRAM 10/16/19   Bradycardia with heart block - TPM notes: P synchronous V pacing and intrinsic pacing with rates in the 40s  Evidence of inferior and inferolateral infarct with RV involvement  Normal LV overall systolic function: LVEF = 60%  Calcified BAV with no stenosis and mild aortic regurgitation  Ascending aorta measures 42 mm      CARDIAC CATHETERIZATION 10/15/2019    · Ramus lesion with 85% stenosis.  · Prox LAD to Mid LAD lesion with 85% stenosis.  · Prox RCA lesion with 80% stenosis.  · Mid RCA lesion with 65% stenosis.  · Ost RPDA to RPDA lesion with 95% stenosis.     Assessment:  Multivessel dx as follows:  RCA with multiple lesions including 95% PDA, likely culprit, but AVRIL 3 flow  Long mid LAD 85%  Large Ramus with mid 85%  Successful TVP placement with intermittent pacing        Plan:  TVP remains in place  Hold all AV giovanny blocking agents  Integrilin for 24 hours  Statin, ASA; hold Brilinta   CT sx consult for consideration of CABG  Will get EP on board in AM if bradycardia not improved.      ECG INTERPRETATION   Results for orders placed or performed during the hospital encounter of 10/15/19   Electrocardiogram 12-Lead   Result Value Ref Range    Systolic Blood Pressure 115     Diastolic Blood Pressure 65     Ventricular Rate EKG/Min (BPM) 48     Atrial Rate (BPM) 81     QRS-Interval (MSEC) 90     QT-Interval (MSEC) 486     QTc 434     R Axis (Degrees) 92     T Axis (Degrees) 89     REPORT TEXT       Sinus rhythm  with complete heart block  and  Junctional rhythm  Rightward axis  Inferior infarct  , possibly acute  T wave abnormality, consider lateral ischemia  ** ** ACUTE MI / STEMI ** **  Consider right ventricular involvement in acute inferior infarct  Abnormal ECG  When compared with ECG of  15-OCT-2019 20:26,  Junctional rhythm  has replaced  Electronic ventricular pacemaker           TELEMETRY:  Third degree block 30's to 50's paced on  demand    ASSESSMENT AND PLAN     IMPRESSION:  1. STEMI with recannulization of RCA/late presentation  2. Complete Heart Block  3. Hypertension  4. Bicuspid Aortic valve    Heart rates are improved from overnight.  He is asymptomatic.  EP has been consulted and all AV giovanny blocking agents have loan held.  Continue ASA, statin, and Integrelin.  CT surgery has been consulted.  Possible CABG on Monday 10/21.  Continue to monitor heart rates and will decide tomorrow if temporary pacemaker needs to be placed prior to surgery.   63 yo male w/ PMH of psychosis, GERD, HLD presents from [facility] for AMS due to hyponatremia    #Metabolic Encephalopathy, resolved  #Hyponatremia  Low serum Osmolality and low urine osmolality, d/t poor po intake or psychogenic polydipsia  Nephrology on board  -  sodium is now normal    #Schizophrenia  - Cont cloazpine 400mg qHs-- psych follow up for medication adjustment.    #UTI  - Cont IV Rocephin 1g q24h in patient-- klebsiella in urine cx--can be given vantin at DC  has a mujica and will do trail of void if mujica is not chronic.    #COVID  Asymptomatic  ID suggested RDV for 3 days-- will do it if patient stays and has worsening symptoms    DC plan to Group home today-- spent more than 30mins

## 2022-08-04 NOTE — DISCHARGE NOTE NURSING/CASE MANAGEMENT/SOCIAL WORK - PATIENT PORTAL LINK FT
You can access the FollowMyHealth Patient Portal offered by [facility] by registering at the following website: [email or web address]. By joining [facility]’s FollowMyHealth portal, you will also be able to view your health information using other applications (apps) compatible with our system.

## 2022-08-04 NOTE — PROGRESS NOTE ADULT - SUBJECTIVE AND OBJECTIVE BOX
SUBJECTIVE:    Patient is a 62y old Male who presents with a chief complaint of hyponatremia (04 Aug 2022 10:44)    Currently admitted to medicine with the primary diagnosis of Hyponatremia       Today is hospital day 3d.     PAST MEDICAL & SURGICAL HISTORY  Schizophrenia    Hyperlipidemia    GERD (gastroesophageal reflux disease)      ALLERGIES:  No Known Allergies    MEDICATIONS:  STANDING MEDICATIONS  cloZAPine 100 milliGRAM(s) Oral four times a day  enoxaparin Injectable 40 milliGRAM(s) SubCutaneous every 24 hours  misoprostol 100 MICROGram(s) Oral <User Schedule>  nicotine -   7 mG/24Hr(s) Patch 1 Patch Transdermal daily  pantoprazole    Tablet 40 milliGRAM(s) Oral before breakfast  polyethylene glycol 3350 17 Gram(s) Oral two times a day  senna 2 Tablet(s) Oral at bedtime  thiothixene 20 milliGRAM(s) Oral <User Schedule>    PRN MEDICATIONS    VITALS:   T(F): 96.2  HR: 65  BP: 109/60  RR: 18  SpO2: 96%    LABS:                        13.6   5.81  )-----------( 200      ( 04 Aug 2022 08:05 )             38.3     08-04    139  |  107  |  10  ----------------------------<  100<H>  4.1   |  26  |  0.8    Ca    8.9      04 Aug 2022 08:05  Mg     1.9     08-04    TPro  6.1  /  Alb  3.9  /  TBili  <0.2  /  DBili  x   /  AST  16  /  ALT  18  /  AlkPhos  73  08-04              Culture - Blood (collected 02 Aug 2022 18:04)  Source: .Blood Blood-Peripheral  Preliminary Report (04 Aug 2022 01:02):    No growth to date.    Culture - Urine (collected 01 Aug 2022 19:40)  Source: Clean Catch Clean Catch (Midstream)  Final Report (04 Aug 2022 08:15):    >100,000 CFU/ml Klebsiella pneumoniae  Organism: Klebsiella pneumoniae (04 Aug 2022 08:15)  Organism: Klebsiella pneumoniae (04 Aug 2022 08:15)          RADIOLOGY:    PHYSICAL EXAM:  GEN: No acute distress  LUNGS: Clear to auscultation bilaterally   HEART: S1/S2 present. RRR.   ABD/ GI: Soft, non-tender, non-distended. Bowel sounds present  EXT: NC/NC/NE/2+PP/ARIAS  NEURO: AAOX3     SUBJECTIVE:    Patient is a 62y old Male who presents with a chief complaint of hyponatremia (04 Aug 2022 10:44)    Currently admitted to medicine with the primary diagnosis of Hyponatremia       Today is hospital day 3d.     PAST MEDICAL & SURGICAL HISTORY  Schizophrenia    Hyperlipidemia    GERD (gastroesophageal reflux disease)      ALLERGIES:  No Known Allergies    MEDICATIONS:  STANDING MEDICATIONS  cloZAPine 100 milliGRAM(s) Oral four times a day  enoxaparin Injectable 40 milliGRAM(s) SubCutaneous every 24 hours  misoprostol 100 MICROGram(s) Oral <User Schedule>  nicotine -   7 mG/24Hr(s) Patch 1 Patch Transdermal daily  pantoprazole    Tablet 40 milliGRAM(s) Oral before breakfast  polyethylene glycol 3350 17 Gram(s) Oral two times a day  senna 2 Tablet(s) Oral at bedtime  thiothixene 20 milliGRAM(s) Oral <User Schedule>    PRN MEDICATIONS    VITALS:   T(F): 96.2  HR: 65  BP: 109/60  RR: 18  SpO2: 96%    LABS:                        13.6   5.81  )-----------( 200      ( 04 Aug 2022 08:05 )             38.3     08-04    139  |  107  |  10  ----------------------------<  100<H>  4.1   |  26  |  0.8    Ca    8.9      04 Aug 2022 08:05  Mg     1.9     08-04    TPro  6.1  /  Alb  3.9  /  TBili  <0.2  /  DBili  x   /  AST  16  /  ALT  18  /  AlkPhos  73  08-04              Culture - Blood (collected 02 Aug 2022 18:04)  Source: .Blood Blood-Peripheral  Preliminary Report (04 Aug 2022 01:02):    No growth to date.    Culture - Urine (collected 01 Aug 2022 19:40)  Source: Clean Catch Clean Catch (Midstream)  Final Report (04 Aug 2022 08:15):    >100,000 CFU/ml Klebsiella pneumoniae  Organism: Klebsiella pneumoniae (04 Aug 2022 08:15)  Organism: Klebsiella pneumoniae (04 Aug 2022 08:15)          RADIOLOGY:    PHYSICAL EXAM:  GEN: No acute distress  LUNGS: Clear to auscultation bilaterally   HEART: S1/S2 present. RRR.   ABD/ GI: Soft, non-tender, non-distended. Bowel sounds present  EXT: NC/NC/NE/2+PP/ARIAS  NEURO: Alert and awake

## 2022-08-04 NOTE — DISCHARGE NOTE PROVIDER - HOSPITAL COURSE
61 yo male w/ PMH of schizophrenia, GERD, HLD presents from [facility] for AMS. Last time seen normal was yesterday. At the time of change in mental status, staff noticed that he was unresponsive. At the ED, patient VS were stable, slightly afebrile (35.6C), 95% on RA.  BG was 120.  Stroke code was called on arrival. He was found to be awake, not responding to questions, Right pupil 4mm, left pupil 2mm. CT head showed no acute intracranial pathology, CTA shows no LVO. Labs were remarkable for Hb (12.9), and Sodium (115) + chloride (88) + low osmolality (257). Urine osmolality normal (117) and Na Urine < 20. UA (+) for LE, nitrite, and bacteria. COVID-19 (+). Alcohol, salicylate, and acetaminophen were (-). Patient was given 1x 1G IV ceftriaxone, and NS 2L bolus. Sodium subsequently improved to 117 in 3 hours. On exam when talking with patient, he does not remember why he was brought to the hospital. He has no symptomatic complaints. Denies headaches, dizziness, confusion, chest pain, shortness of breath, abdominal pain, n/v, dysuria.     Patient is being admitted to ICU for severe hyponatremia. Patient's hyponatremia was corrected. Patient is now stable and safe to be discharged.    #Metabolic Encephalopathy, resolved  #Hyponatremia, resolved    #Schizophrenia  - C/w cloazpine 100 mg four times a day and thiothixene 20 mg daily    #UTI  - Cont IV Rocephin 1g q24h in patient-- klebsiella in urine cx--can be given vantin at [place]  has a mujica and will do trail of void if mujica is not chronic.    #COVID  Asymptomatic  ID suggested RDV for 3 days-- will do it if patient stays and has worsening symptoms   63 yo male w/ PMH of schizophrenia, GERD, HLD presents from [facility] for AMS. Last time seen normal was yesterday. At the time of change in mental status, staff noticed that he was unresponsive. At the ED, patient VS were stable, slightly afebrile (35.6C), 95% on RA.  BG was 120.  Stroke code was called on arrival. He was found to be awake, not responding to questions, Right pupil 4mm, left pupil 2mm. CT head showed no acute intracranial pathology, CTA shows no LVO. Labs were remarkable for Hb (12.9), and Sodium (115) + chloride (88) + low osmolality (257). Urine osmolality normal (117) and Na Urine < 20. UA (+) for LE, nitrite, and bacteria. COVID-19 (+). Alcohol, salicylate, and acetaminophen were (-). Patient was given 1x 1G IV ceftriaxone, and NS 2L bolus. Sodium subsequently improved to 117 in 3 hours. On exam when talking with patient, he does not remember why he was brought to the hospital. He has no symptomatic complaints. Denies headaches, dizziness, confusion, chest pain, shortness of breath, abdominal pain, n/v, dysuria.     Patient admitted to ICU for severe hyponatremia and later downgraded. Nephro was consulted and patient sodium ultimately corrected. Patient subsequently medically stable for discharge back to facility. Rest of plan as below.     #Metabolic Encephalopathy, resolved  #Hyponatremia, resolved    #Schizophrenia  - psych consulted and confirmed home meds - Clozaril 100mg po tabs (4 tablets at night), Navane 10mg po (2 tabs daily) and misoprostol 100mg po daily  - Patient sees Dr. Luiza Barakat psychiatrist at [facility] who has been managed his medications and will continue follow up with this physician upon patient's return to [facility]    #Klebsiella UTI 2/2 chronic mujica  - started on rocephin while inpt - will d/c on vantin     #asymptomatic COVID  -  s/p 1 dose of RDV

## 2022-08-04 NOTE — PROGRESS NOTE ADULT - SUBJECTIVE AND OBJECTIVE BOX
ELIEL GUERRERO 62y Male  MRN#: [id number]   CODE STATUS:________      SUBJECTIVE  Patient is a 62y old Male who presents with a chief complaint of hyponatremia (03 Aug 2022 13:46)  Currently admitted to medicine with the primary diagnosis of Hyponatremia    Today is hospital day 3d, and this morning he was somnolent on exam with limited response to questions. Patient did answer questions correctly, AOx3. Patient reported no complaints. No acute overnight events.    OBJECTIVE  PAST MEDICAL & SURGICAL HISTORY  Schizophrenia  Hyperlipidemia  GERD (gastroesophageal reflux disease)      ALLERGIES:  No Known Allergies    MEDICATIONS:  STANDING MEDICATIONS  cloZAPine 100 milliGRAM(s) Oral four times a day  enoxaparin Injectable 40 milliGRAM(s) SubCutaneous every 24 hours  misoprostol 100 MICROGram(s) Oral <User Schedule>  nicotine -   7 mG/24Hr(s) Patch 1 Patch Transdermal daily  pantoprazole    Tablet 40 milliGRAM(s) Oral before breakfast  polyethylene glycol 3350 17 Gram(s) Oral two times a day  remdesivir  IVPB   IV Intermittent   remdesivir  IVPB 200 milliGRAM(s) IV Intermittent every 24 hours  senna 2 Tablet(s) Oral at bedtime  thiothixene 20 milliGRAM(s) Oral <User Schedule>      VITAL SIGNS: Last 24 Hours  T(C): 35.7 (04 Aug 2022 08:00), Max: 36.6 (03 Aug 2022 15:12)  T(F): 96.2 (04 Aug 2022 08:00), Max: 97.8 (03 Aug 2022 15:12)  HR: 65 (04 Aug 2022 08:00) (55 - 65)  BP: 109/60 (04 Aug 2022 08:00) (101/50 - 123/67)  RR: 18 (04 Aug 2022 08:00) (18 - 18)  SpO2: 96% (04 Aug 2022 08:00) (95% - 97%)    LABS:                        13.6   5.81  )-----------( 200      ( 04 Aug 2022 08:05 )             38.3     08-04    139  |  107  |  10  ----------------------------<  100<H>  4.1   |  26  |  0.8    Ca    8.9      04 Aug 2022 08:05  Mg     1.9     08-04    TPro  6.1  /  Alb  3.9  /  TBili  <0.2  /  DBili  x   /  AST  16  /  ALT  18  /  AlkPhos  73  08-04      Culture - Blood (collected 02 Aug 2022 18:04)  Source: .Blood Blood-Peripheral  Preliminary Report (04 Aug 2022 01:02):    No growth to date.    Culture - Urine (collected 01 Aug 2022 19:40)  Source: Clean Catch Clean Catch (Midstream)  Final Report (04 Aug 2022 08:15):    >100,000 CFU/ml Klebsiella pneumoniae  Organism: Klebsiella pneumoniae (04 Aug 2022 08:15)  Organism: Klebsiella pneumoniae (04 Aug 2022 08:15)      RADIOLOGY:  Xray Chest 1 View- PORTABLE-Routine (Xray Chest 1 View- PORTABLE-Routine in AM.) (08.03.22 @ 06:29) >No radiographic evidence of acute cardiopulmonary disease.      PHYSICAL EXAM:  GENERAL: NAD, well-developed, AAOx3, somnolent, on room air  HEENT:  Atraumatic, Normocephalic. EOMI  PULMONARY: Clear to auscultation bilaterally  CARDIOVASCULAR: Regular rate and rhythm; No murmurs, rubs, or gallops  GASTROINTESTINAL: Soft, Nontender, Nondistended  MUSCULOSKELETAL:  2+ Peripheral Pulses, No clubbing, cyanosis, or edema  NEUROLOGY: non-focal, patient was somnolent and did not move on exam  SKIN: No rashes or lesions      ADMISSION SUMMARY  Patient is a 62y old Male who presents with a chief complaint of hyponatremia (03 Aug 2022 13:46)  Currently admitted to medicine with the primary diagnosis of Hyponatremia    ASSESSMENT & PLAN  63 yo male w/ PMH of psychosis, GERD, HLD presents from [facility] for AMS due to hyponatremia    #Metabolic Encephalopathy, resolved  #Hyponatremia  Low serum Osmolality and low urine osmolality    Nephrology on board, planned f/u    #Schizophrenia  - Cont clozapine 100 mg 4 times daily  - Home medication 20 mg at bedtime    #UTI  - Cont IV Rocephin 1g q24h    #COVID  -Asymptomatic  -Starting 3 days of RDV per ID recommendation    DVT PPX: Lovenox    #Progress Note Handoff  Pending (specify): Monitor Na  Family discussion: d/w pt regarding possible DC tomorrow if Na levels are stable  Disposition: Home     ELIEL GUERRERO 62y Male  MRN#: [id number]   CODE STATUS: Full      SUBJECTIVE  Patient is a 62y old Male who presents with a chief complaint of hyponatremia (03 Aug 2022 13:46)  Currently admitted to medicine with the primary diagnosis of Hyponatremia    Today is hospital day 3d, and this morning he was somnolent on exam with limited response to questions. Patient did answer questions correctly, AOx3. Patient reported no complaints. No acute overnight events.    OBJECTIVE  PAST MEDICAL & SURGICAL HISTORY  Schizophrenia  Hyperlipidemia  GERD (gastroesophageal reflux disease)      ALLERGIES:  No Known Allergies    MEDICATIONS:  STANDING MEDICATIONS  cloZAPine 100 milliGRAM(s) Oral four times a day  enoxaparin Injectable 40 milliGRAM(s) SubCutaneous every 24 hours  misoprostol 100 MICROGram(s) Oral <User Schedule>  nicotine -   7 mG/24Hr(s) Patch 1 Patch Transdermal daily  pantoprazole    Tablet 40 milliGRAM(s) Oral before breakfast  polyethylene glycol 3350 17 Gram(s) Oral two times a day  remdesivir  IVPB   IV Intermittent   remdesivir  IVPB 200 milliGRAM(s) IV Intermittent every 24 hours  senna 2 Tablet(s) Oral at bedtime  thiothixene 20 milliGRAM(s) Oral <User Schedule>      VITAL SIGNS: Last 24 Hours  T(C): 35.7 (04 Aug 2022 08:00), Max: 36.6 (03 Aug 2022 15:12)  T(F): 96.2 (04 Aug 2022 08:00), Max: 97.8 (03 Aug 2022 15:12)  HR: 65 (04 Aug 2022 08:00) (55 - 65)  BP: 109/60 (04 Aug 2022 08:00) (101/50 - 123/67)  RR: 18 (04 Aug 2022 08:00) (18 - 18)  SpO2: 96% (04 Aug 2022 08:00) (95% - 97%)    LABS:                        13.6   5.81  )-----------( 200      ( 04 Aug 2022 08:05 )             38.3     08-04    139  |  107  |  10  ----------------------------<  100<H>  4.1   |  26  |  0.8    Ca    8.9      04 Aug 2022 08:05  Mg     1.9     08-04    TPro  6.1  /  Alb  3.9  /  TBili  <0.2  /  DBili  x   /  AST  16  /  ALT  18  /  AlkPhos  73  08-04      Culture - Blood (collected 02 Aug 2022 18:04)  Source: .Blood Blood-Peripheral  Preliminary Report (04 Aug 2022 01:02):    No growth to date.    Culture - Urine (collected 01 Aug 2022 19:40)  Source: Clean Catch Clean Catch (Midstream)  Final Report (04 Aug 2022 08:15):    >100,000 CFU/ml Klebsiella pneumoniae  Organism: Klebsiella pneumoniae (04 Aug 2022 08:15)  Organism: Klebsiella pneumoniae (04 Aug 2022 08:15)      RADIOLOGY:  Xray Chest 1 View- PORTABLE-Routine (Xray Chest 1 View- PORTABLE-Routine in AM.) (08.03.22 @ 06:29) >No radiographic evidence of acute cardiopulmonary disease.      PHYSICAL EXAM:  GENERAL: NAD, well-developed, AAOx3, somnolent, on room air  HEENT:  Atraumatic, Normocephalic. EOMI  PULMONARY: Clear to auscultation bilaterally  CARDIOVASCULAR: Regular rate and rhythm; No murmurs, rubs, or gallops  GASTROINTESTINAL: Soft, Nontender, Nondistended  MUSCULOSKELETAL:  2+ Peripheral Pulses, No clubbing, cyanosis, or edema  NEUROLOGY: non-focal, patient was somnolent and did not move on exam  SKIN: No rashes or lesions      ADMISSION SUMMARY  Patient is a 62y old Male who presents with a chief complaint of hyponatremia (03 Aug 2022 13:46)  Currently admitted to medicine with the primary diagnosis of Hyponatremia    ASSESSMENT & PLAN  61 yo male w/ PMH of psychosis, GERD, HLD presents from [facility] for AMS due to hyponatremia    #Metabolic Encephalopathy, resolved  #Hyponatremia  - Low serum Osmolality and low urine osmolality  - Nephrology on board  - Sodium is corrected, most recent 139  -     #Schizophrenia  - Cont clozapine 100 mg 4 times daily  - Home medication reported to be 20 mg at bedtime    #UTI  - Cont IV Rocephin 1g q24h    #COVID  -Asymptomatic  -Starting 3 days of RDV per ID recommendation    DVT PPX: Lovenox    #Progress Note Handoff  Pending (specify): Monitor Na  Family discussion: d/w pt regarding possible DC tomorrow if Na levels are stable  Disposition: Home     ELIEL GUERRERO 62y Male  MRN#: [id number]   CODE STATUS: Full      SUBJECTIVE  Patient is a 62y old Male who presents with a chief complaint of hyponatremia (03 Aug 2022 13:46)  Currently admitted to medicine with the primary diagnosis of Hyponatremia    Today is hospital day 3d, and this morning he was somnolent on exam with limited response to questions. Patient did answer questions correctly, AOx3. Patient reported no complaints. No acute overnight events.    OBJECTIVE  PAST MEDICAL & SURGICAL HISTORY  Schizophrenia  Hyperlipidemia  GERD (gastroesophageal reflux disease)      ALLERGIES:  No Known Allergies    MEDICATIONS:  STANDING MEDICATIONS  cloZAPine 100 milliGRAM(s) Oral four times a day  enoxaparin Injectable 40 milliGRAM(s) SubCutaneous every 24 hours  misoprostol 100 MICROGram(s) Oral <User Schedule>  nicotine -   7 mG/24Hr(s) Patch 1 Patch Transdermal daily  pantoprazole    Tablet 40 milliGRAM(s) Oral before breakfast  polyethylene glycol 3350 17 Gram(s) Oral two times a day  remdesivir  IVPB   IV Intermittent   remdesivir  IVPB 200 milliGRAM(s) IV Intermittent every 24 hours  senna 2 Tablet(s) Oral at bedtime  thiothixene 20 milliGRAM(s) Oral <User Schedule>      VITAL SIGNS: Last 24 Hours  T(C): 35.7 (04 Aug 2022 08:00), Max: 36.6 (03 Aug 2022 15:12)  T(F): 96.2 (04 Aug 2022 08:00), Max: 97.8 (03 Aug 2022 15:12)  HR: 65 (04 Aug 2022 08:00) (55 - 65)  BP: 109/60 (04 Aug 2022 08:00) (101/50 - 123/67)  RR: 18 (04 Aug 2022 08:00) (18 - 18)  SpO2: 96% (04 Aug 2022 08:00) (95% - 97%)    LABS:                        13.6   5.81  )-----------( 200      ( 04 Aug 2022 08:05 )             38.3     08-04    139  |  107  |  10  ----------------------------<  100<H>  4.1   |  26  |  0.8    Ca    8.9      04 Aug 2022 08:05  Mg     1.9     08-04    TPro  6.1  /  Alb  3.9  /  TBili  <0.2  /  DBili  x   /  AST  16  /  ALT  18  /  AlkPhos  73  08-04      Culture - Blood (collected 02 Aug 2022 18:04)  Source: .Blood Blood-Peripheral  Preliminary Report (04 Aug 2022 01:02):    No growth to date.    Culture - Urine (collected 01 Aug 2022 19:40)  Source: Clean Catch Clean Catch (Midstream)  Final Report (04 Aug 2022 08:15):    >100,000 CFU/ml Klebsiella pneumoniae  Organism: Klebsiella pneumoniae (04 Aug 2022 08:15)  Organism: Klebsiella pneumoniae (04 Aug 2022 08:15)      RADIOLOGY:  Xray Chest 1 View- PORTABLE-Routine (Xray Chest 1 View- PORTABLE-Routine in AM.) (08.03.22 @ 06:29) >No radiographic evidence of acute cardiopulmonary disease.      PHYSICAL EXAM:  GENERAL: NAD, well-developed, AAOx3, somnolent, on room air  HEENT:  Atraumatic, Normocephalic. EOMI  PULMONARY: Clear to auscultation bilaterally  CARDIOVASCULAR: Regular rate and rhythm; No murmurs, rubs, or gallops  GASTROINTESTINAL: Soft, Nontender, Nondistended  MUSCULOSKELETAL:  2+ Peripheral Pulses, No clubbing, cyanosis, or edema  NEUROLOGY: non-focal, patient was somnolent and did not move on exam  SKIN: No rashes or lesions      ADMISSION SUMMARY  Patient is a 62y old Male who presents with a chief complaint of hyponatremia (03 Aug 2022 13:46)  Currently admitted to medicine with the primary diagnosis of Hyponatremia    ASSESSMENT & PLAN  61 yo male w/ PMH of psychosis, GERD, HLD presents from [facility] for AMS due to hyponatremia    #Metabolic Encephalopathy, resolved  #Hyponatremia  - Low serum Osmolality and low urine osmolality  - Nephrology on board  - Sodium is corrected, most recent 139  - Concern for patient taking Clozaril and Thiothixene for repeat hyponatremia, Psychiatry consulted    #Schizophrenia  - Cont clozapine 100 mg 4 times daily  - Home medication reported to be 20 Thiothixene mg at bedtime, cotinue    #UTI  - Cont IV Rocephin 1g q24h    #COVID  -Asymptomatic  -Starting 3 days of RDV per ID recommendation 8/4    DVT PPX: Lovenox    #Progress Note Handoff  Pending (specify): Monitor Na  Disposition: Home

## 2022-08-04 NOTE — CHART NOTE - NSCHARTNOTEFT_GEN_A_CORE
IMPRESSION:    Severe Hyponatremia (Na 115) with Encephalopathy   Mild COVID - 19   UTI    PLAN    CNS: No sedation, EEG, MRI brain, urine and serum tox    HEENT: HOB 30-45 degrees, aspiration precautions. oral care    CARDIAC: GDFR, Keep MAP >65, avoid fluid overload    PULMONARY: Keep POx > 94%.     GASTROENTEROLOGY: PPI ppx. Diet. NPO.     RENAL: Monitor CMP & UO. Correct as needed. BMP q 4. Trend Na, correction should not exceed 8 in 24 hours, urine lytes, creatinine kinase    INFECTIOUS: UA +, c/w Rocephin, follow urine Cx    HEMATOLOGY: DVT ppx    ENDOCRINOLOGY: Check FS, insulin protocol as needed    MUSCULOSKELETAL: Bed rest    DISPOSITION: MICU
This writer spoke with this patient's daughter and confirmed the psychotropic medications patient is taken to be Clozaril 100mg po tabs (4 tablets at night).  Navane 10mg po (2 tabs daily) and misoprostol 100mg po daily. Patient sees Dr. Luiza Barakat psychiatrist at [facility] who has been managed his medications and will continue follow up with this physician upon patient's return to [facility]. At this time, patient should continue on his home psychotropic medications.
MICU Transfer Note  ---------------------------    Transfer from: MICU  Transfer to:  (  ) Medicine    (  ) Telemetry    (  ) RCU    (  ) Palliative    (  ) Stroke Unit    (  ) _______________  Accepting Physician:      MICU COURSE    61 yo male w/ PMH of psychosis, GERD, HLD presents from [facility] for AMS. Last time seen normal was yesterday. At the time of change in mental status, staff noticed that he was unresponsive. At the ED, patient VS were stable, slightly afebrile (35.6C), 95% on RA.  BG was 120.  Stroke code was called on arrival. He was found to be awake, not responding to questions, Right pupil 4mm, left pupil 2mm. CT head showed no acute intracranial pathology, CTA shows no LVO. Labs were remarkable for Hb (12.9), and Sodium (115) + chloride (88) + low osmolality (257). Urine osmolality normal (117) and Na Urine < 20. UA (+) for LE, nitrite, and bacteria. COVID-19 (+). Alcohol, salicylate, and acetaminophen were (-). Patient was given 1x 1G IV ceftriaxone, and NS 2L bolus. Sodium subsequently improved to 117 in 3 hours. On exam when talking with patient, he does not remember why he was brought to the hospital. He has no symptomatic complaints. Denies headaches, dizziness, confusion, chest pain, shortness of breath, abdominal pain, n/v, dysuria.     Patient is being admitted to ICU for severe hyponatremia with encephalopathy.     Update: patient takes clozapine and thiothiexene. Patient does not know dose, no pharmacy listed on record or physical chart. Please confirm dose tomorrow before restarting.     Patient has received 2 L NS and Na went up from 115 to 117 in PM  s/p  cc today  AM Na 137; started on D5W  Neurologically stable , alert   Nephrology consult and ID evaluation requested       To-Do:    [ ] BMP q shift  [ ] nephrology follow up  [ ] d/c dextrose IVF when appropriate  [ ] monitor mentation; neuro checks     OBJECTIVE --  Vital Signs Last 24 Hrs  T(C): 36.7 (02 Aug 2022 07:12), Max: 37.1 (01 Aug 2022 19:54)  T(F): 98.1 (02 Aug 2022 07:12), Max: 98.8 (01 Aug 2022 19:54)  HR: 58 (02 Aug 2022 09:30) (58 - 79)  BP: 119/67 (02 Aug 2022 09:30) (81/53 - 140/68)  BP(mean): 85 (02 Aug 2022 09:30) (63 - 85)  RR: 20 (02 Aug 2022 09:30) (16 - 20)  SpO2: 96% (02 Aug 2022 09:30) (95% - 99%)    Parameters below as of 02 Aug 2022 09:30  Patient On (Oxygen Delivery Method): room air        I&O's Summary    01 Aug 2022 07:01  -  02 Aug 2022 07:00  --------------------------------------------------------  IN: 2050 mL / OUT: 4000 mL / NET: -1950 mL        MEDICATIONS  (STANDING):  cefTRIAXone   IVPB 1000 milliGRAM(s) IV Intermittent every 24 hours  dextrose 5%. 1000 milliLiter(s) (100 mL/Hr) IV Continuous <Continuous>  enoxaparin Injectable 40 milliGRAM(s) SubCutaneous every 24 hours  nicotine -   7 mG/24Hr(s) Patch 1 Patch Transdermal daily  pantoprazole    Tablet 40 milliGRAM(s) Oral before breakfast  polyethylene glycol 3350 17 Gram(s) Oral two times a day  senna 2 Tablet(s) Oral at bedtime    MEDICATIONS  (PRN):        LABS                                            14.9                  Neurophils% (auto):   66.7   (08-02 @ 07:00):    5.98 )-----------(172          Lymphocytes% (auto):  19.2                                          41.9                   Eosinphils% (auto):   0.0      Manual%: Neutrophils x    ; Lymphocytes x    ; Eosinophils x    ; Bands%: x    ; Blasts x                                    137    |  100    |  9                   Calcium: 9.0   / iCa: x      (08-02 @ 07:00)    ----------------------------<  77        Magnesium: 2.0                              5.1     |  27     |  0.9              Phosphorous: x        TPro  6.4    /  Alb  4.2    /  TBili  0.5    /  DBili  x      /  AST  27     /  ALT  20     /  AlkPhos  80     02 Aug 2022 07:00            ASSESSMENT & PLAN:       Severe hyponatremia   COVID 19 infection   HO schizophrenia      PLAN:    CNS:  No depressants.  Continue home psych medications     HEENT: Oral care    PULMONARY:  HOB @ 45 degrees.  Aspiration precautions.  Monitor PUlse Ox.      CARDIOVASCULAR:  D5W at 100 cc/hr keep MAP > 60 mmHg     GI: GI prophylaxis.  Feeding.  Bowel regimen.  Free H2O restriction     RENAL:  Follow up lytes in 4 hours.  Monitor Lytes.      INFECTIOUS DISEASE: Follow up cultures.  Procal.  ID Eval.  Hold ABX for now     HEMATOLOGICAL:  DVT prophylaxis.  Dimer     ENDOCRINE:  Follow up FS.      MUSCULOSKELETAL:  OOB to chair with fall precautions.      SDU For now
Per patient experience, patient's sister Angela called to give patient's med rec. She states that it took a long time to find a regimen that works well for him and he deteriorates when not receiving these medications.    Clozapine 100mg PO QID  Navane 10mg 2 tabs PO 8pm  Misoprostol 100mcg PO 8am  Colace 100mg PO TID (8am, noon, 5pm)  Senna 2 tabs PO 8pm  Aspirin 81mg PO AM  Ensure 8oz TID    Angela gave a number for callback: [phone].

## 2022-08-04 NOTE — DISCHARGE NOTE PROVIDER - NSDCMRMEDTOKEN_GEN_ALL_CORE_FT
Clozaril:   Queta:    aspirin 81 mg oral tablet, chewable: 1 tab(s) orally once a day  Clozaril 100 mg oral tablet: 4 tab(s) orally once a day (at bedtime)  Colace 100 mg oral capsule: 1 cap(s) orally 3 times a day (8am, 12pm, 5pm)  miSOPROStol 100 mcg oral tablet: 1 tab(s) orally once a day  pantoprazole 40 mg oral delayed release tablet: 1 tab(s) orally once a day (before a meal)  senna leaf extract oral tablet: 2 tab(s) orally once a day (at bedtime)  thiothixene 10 mg oral capsule: 2 cap(s) orally once a day   aspirin 81 mg oral tablet, chewable: 1 tab(s) orally once a day  cefpodoxime 100 mg oral tablet: 1 tab(s) orally every 12 hours   Clozaril 100 mg oral tablet: 4 tab(s) orally once a day (at bedtime)  Colace 100 mg oral capsule: 1 cap(s) orally 3 times a day (8am, 12pm, 5pm)  miSOPROStol 100 mcg oral tablet: 1 tab(s) orally once a day  pantoprazole 40 mg oral delayed release tablet: 1 tab(s) orally once a day (before a meal)  senna leaf extract oral tablet: 2 tab(s) orally once a day (at bedtime)  thiothixene 10 mg oral capsule: 2 cap(s) orally once a day

## 2022-08-08 LAB
CULTURE RESULTS: SIGNIFICANT CHANGE UP
SPECIMEN SOURCE: SIGNIFICANT CHANGE UP

## 2022-08-10 DIAGNOSIS — E78.5 HYPERLIPIDEMIA, UNSPECIFIED: ICD-10-CM

## 2022-08-10 DIAGNOSIS — E87.1 HYPO-OSMOLALITY AND HYPONATREMIA: ICD-10-CM

## 2022-08-10 DIAGNOSIS — U07.1 COVID-19: ICD-10-CM

## 2022-08-10 DIAGNOSIS — B96.1 KLEBSIELLA PNEUMONIAE [K. PNEUMONIAE] AS THE CAUSE OF DISEASES CLASSIFIED ELSEWHERE: ICD-10-CM

## 2022-08-10 DIAGNOSIS — K21.9 GASTRO-ESOPHAGEAL REFLUX DISEASE WITHOUT ESOPHAGITIS: ICD-10-CM

## 2022-08-10 DIAGNOSIS — G93.41 METABOLIC ENCEPHALOPATHY: ICD-10-CM

## 2022-08-10 DIAGNOSIS — E86.0 DEHYDRATION: ICD-10-CM
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
## 2022-08-10 DIAGNOSIS — F17.210 NICOTINE DEPENDENCE, CIGARETTES, UNCOMPLICATED: ICD-10-CM

## 2022-08-10 DIAGNOSIS — F20.9 SCHIZOPHRENIA, UNSPECIFIED: ICD-10-CM

## 2022-08-10 DIAGNOSIS — N39.0 URINARY TRACT INFECTION, SITE NOT SPECIFIED: ICD-10-CM

## 2022-08-10 DIAGNOSIS — T83.511A INFECTION AND INFLAMMATORY REACTION DUE TO INDWELLING URETHRAL CATHETER, INITIAL ENCOUNTER: ICD-10-CM
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
## 2022-09-09 NOTE — DISCHARGE NOTE NURSING/CASE MANAGEMENT/SOCIAL WORK - TOBACCO CESSATION EDUCATION/COUNSELLING(PROVIDED IF TOBACCO USED IN THE PAST 30 DAYS) NON CORE MEASURE SITES
Referral from Dr. Olsen   PCP note is not completed at this time   No previous imaging in EPIC  No recent TSH result   Levothyroxine 137mg on medication list     PSRs,   Can schedule with first available provider  Would need records from previous endocrinologist/surgery prior to visit     Thank you    Offered and patient declined

## 2022-12-18 NOTE — DISCHARGE NOTE PROVIDER - NSDCQMSTROKE_NEU_ALL_CORE
Problem: Pain  Goal: Acceptable pain/comfort level is achieved/maintained at rest based on PAINAID scale (Dementia)  Description: This goal is used for patients who are not able to self-report pain, have dementia, and assessed using the PAINAD scale.  Outcome: Outcome Met, Continue evaluating goal progress toward completion     Problem: At Risk for Falls  Goal: # Patient does not fall  Outcome: Outcome Met, Continue evaluating goal progress toward completion      No

## 2024-01-22 NOTE — PATIENT PROFILE ADULT - DATE OF LAST VACCINATION
/24  LRF 10/23/23    
No care due was identified.  [facility] Embedded Care Due Messages. Reference number: [id number].   1/21/2024 11:20:32 PM CST  
Patient needs an appointment.  Refill refused.  
Refill Routing Note   Medication(s) are not appropriate for processing by [facility] for the following reason(s):        Patient not seen by provider within 15 months    [facility] action(s):  Defer               Appointments  past 12m or future 3m with PCP    Date Provider   Last Visit   10/18/2022 Aramis Swann MD   Next Visit   Visit date not found Aramis Swann MD   ED visits in past 90 days: 0        Note composed:1:55 PM 01/22/2024          
03-Aug-2021